# Patient Record
Sex: MALE | Race: WHITE | Employment: OTHER | URBAN - METROPOLITAN AREA
[De-identification: names, ages, dates, MRNs, and addresses within clinical notes are randomized per-mention and may not be internally consistent; named-entity substitution may affect disease eponyms.]

---

## 2019-07-20 ENCOUNTER — HOSPITAL ENCOUNTER (EMERGENCY)
Age: 84
Discharge: HOME | End: 2019-07-20
Attending: EMERGENCY MEDICINE
Payer: MEDICARE

## 2019-07-20 ENCOUNTER — HOSPITAL ENCOUNTER (EMERGENCY)
Dept: CT IMAGING | Age: 84
Discharge: HOME | End: 2019-07-20
Attending: EMERGENCY MEDICINE
Payer: MEDICARE

## 2019-07-20 VITALS
WEIGHT: 146.83 LBS | DIASTOLIC BLOOD PRESSURE: 72 MMHG | TEMPERATURE: 98.6 F | OXYGEN SATURATION: 94 % | BODY MASS INDEX: 23.6 KG/M2 | HEIGHT: 66 IN | SYSTOLIC BLOOD PRESSURE: 173 MMHG | HEART RATE: 68 BPM | RESPIRATION RATE: 16 BRPM

## 2019-07-20 DIAGNOSIS — R51.9 ACUTE NONINTRACTABLE HEADACHE, UNSPECIFIED HEADACHE TYPE: Primary | ICD-10-CM

## 2019-07-20 LAB
ANION GAP BLD CALC-SCNC: 16 MMOL/L (ref 10–20)
BUN BLD-MCNC: 22 MG/DL (ref 9–20)
CA-I BLD-MCNC: 1.18 MMOL/L (ref 1.12–1.32)
CHLORIDE BLD-SCNC: 97 MMOL/L (ref 98–107)
CO2 BLD-SCNC: 30 MMOL/L (ref 21–32)
CREAT BLD-MCNC: 1.3 MG/DL (ref 0.6–1.3)
GLUCOSE BLD-MCNC: 192 MG/DL (ref 65–100)
HCT VFR BLD CALC: 44 % (ref 36.6–50.3)
POTASSIUM BLD-SCNC: 3.4 MMOL/L (ref 3.5–5.1)
SERVICE CMNT-IMP: ABNORMAL
SODIUM BLD-SCNC: 139 MMOL/L (ref 136–145)

## 2019-07-20 PROCEDURE — 80047 BASIC METABLC PNL IONIZED CA: CPT

## 2019-07-20 PROCEDURE — 74011636320 HC RX REV CODE- 636/320: Performed by: EMERGENCY MEDICINE

## 2019-07-20 PROCEDURE — 70496 CT ANGIOGRAPHY HEAD: CPT

## 2019-07-20 PROCEDURE — 70450 CT HEAD/BRAIN W/O DYE: CPT

## 2019-07-20 PROCEDURE — 99283 EMERGENCY DEPT VISIT LOW MDM: CPT

## 2019-07-20 RX ORDER — HYDROCHLOROTHIAZIDE 25 MG/1
25 TABLET ORAL DAILY
COMMUNITY

## 2019-07-20 RX ORDER — METHYLPREDNISOLONE 4 MG/1
TABLET ORAL
Qty: 1 DOSE PACK | Refills: 0 | Status: SHIPPED | OUTPATIENT
Start: 2019-07-20 | End: 2020-06-08

## 2019-07-20 RX ADMIN — IOPAMIDOL 100 ML: 755 INJECTION, SOLUTION INTRAVENOUS at 11:33

## 2019-07-20 NOTE — DISCHARGE INSTRUCTIONS

## 2019-07-20 NOTE — ED PROVIDER NOTES
Pt. Presents to the ER with complaints of left sided headache. Pt's sx have been present for years. However, they have been present more frequently over the last month. Pt. Says that he gets a sharp pain at his left temple. The pain comes on suddenly, and it only lasts approximately 2 seconds. Pt. Took a norco for it (which he has for his knee pain), and it didn't seem to help. No fevers/chills. No n/v/d. No numbness/tingling or weakness. No vision changes. Past Medical History:   Diagnosis Date    A-fib ([facility] [facility]     Arthritis     Enlarged prostate     High cholesterol     Hx of completed stroke     Hypertension     Stroke [facility])        Past Surgical History:   Procedure Laterality Date    HX HERNIA REPAIR      HX TONSILLECTOMY           History reviewed. No pertinent family history.     Social History     Socioeconomic History    Marital status:      Spouse name: Not on file    Number of children: Not on file    Years of education: Not on file    Highest education level: Not on file   Occupational History    Not on file   Social Needs    Financial resource strain: Not on file    Food insecurity:     Worry: Not on file     Inability: Not on file    Transportation needs:     Medical: Not on file     Non-medical: Not on file   Tobacco Use    Smoking status: Never Smoker    Smokeless tobacco: Never Used   Substance and Sexual Activity    Alcohol use: No    Drug use: No    Sexual activity: Not on file   Lifestyle    Physical activity:     Days per week: Not on file     Minutes per session: Not on file    Stress: Not on file   Relationships    Social connections:     Talks on phone: Not on file     Gets together: Not on file     Attends Sabianism service: Not on file     Active member of club or organization: Not on file     Attends meetings of clubs or organizations: Not on file     Relationship status: Not on file    Intimate partner violence:     Fear of current or ex partner: Not on file     Emotionally abused: Not on file     Physically abused: Not on file     Forced sexual activity: Not on file   Other Topics Concern    Not on file   Social History Narrative    Not on file         ALLERGIES: Patient has no known allergies. Review of Systems   Constitutional: Negative for chills and fever. HENT: Negative for rhinorrhea and sore throat. Respiratory: Negative for cough and shortness of breath. Cardiovascular: Negative for chest pain. Gastrointestinal: Negative for abdominal pain, diarrhea, nausea and vomiting. Genitourinary: Negative for dysuria and hematuria. Musculoskeletal: Negative for arthralgias and myalgias. Skin: Negative for pallor and rash. Neurological: Positive for headaches. Negative for dizziness, weakness and light-headedness. All other systems reviewed and are negative. Vitals:    07/20/19 1014   BP: 182/81   Pulse: 93   Resp: 16   Temp: 98.6 °F (37 °C)   SpO2: 97%   Weight: 66.6 kg (146 lb 13.2 oz)   Height: 5' 6\" (1.676 m)            Physical Exam     Vital signs reviewed. Nursing notes reviewed.     Const:  No acute distress, well developed, well nourished  Head:  Atraumatic, normocephalic  Eyes:  PERRL, conjunctiva normal, no scleral icterus  HEENT:  No tenderness or swelling over the bilateral temples, no bruising, no rash  Neck:  Supple, trachea midline  Cardiovascular:  RRR, no murmurs, no gallops, no rubs  Resp:  No resp distress, no increased work of breathing, no wheezes, no rhonchi, no rales,  Abd:  Soft, non-tender, non-distended, no rebound, no guarding, no CVA tenderness  MSK:  No pedal edema, normal ROM  Neuro:  Alert and oriented x3, no cranial nerve defect, equal strength in his bilateral upper and lower extremities  Skin:  Warm, dry, intact  Psych: normal mood and affect, behavior is normal, judgement and thought content is normal          MDM  Number of Diagnoses or Management Options  Acute nonintractable headache, unspecified headache type:      Amount and/or Complexity of Data Reviewed  Clinical lab tests: ordered and reviewed  Tests in the radiology section of CPT®: ordered and reviewed  Review and summarize past medical records: yes    Patient Progress  Patient progress: stable         Pt. Presents to the ER with complaints of headache. Pt. Is well appearing and pain free in the ER. No mass, bleed, or aneurysm on CT. Pt. To f/u with his PCP or return to the ER with worsening sx.       Procedures

## 2019-07-20 NOTE — ED TRIAGE NOTES
Pt presents to ED with c/o intermittent left temporal headache over the past several weeks. Pt states he had this onset this morning but has subsided. Pt is awake alert and oriented. Pt denies other symptoms.

## 2020-06-08 ENCOUNTER — APPOINTMENT (OUTPATIENT)
Dept: CT IMAGING | Age: 85
End: 2020-06-08
Attending: EMERGENCY MEDICINE
Payer: MEDICARE

## 2020-06-08 ENCOUNTER — HOSPITAL ENCOUNTER (OUTPATIENT)
Age: 85
Setting detail: OBSERVATION
Discharge: HOME | End: 2020-06-09
Attending: EMERGENCY MEDICINE | Admitting: INTERNAL MEDICINE
Payer: MEDICARE

## 2020-06-08 DIAGNOSIS — N32.89 BLADDER MASS: Primary | ICD-10-CM

## 2020-06-08 DIAGNOSIS — N39.0 URINARY TRACT INFECTION WITHOUT HEMATURIA, SITE UNSPECIFIED: ICD-10-CM

## 2020-06-08 PROBLEM — K59.00 CONSTIPATED: Status: ACTIVE | Noted: 2020-06-08

## 2020-06-08 LAB
ALBUMIN SERPL-MCNC: 4.2 G/DL (ref 3.5–5)
ALBUMIN/GLOB SERPL: 1.6 {RATIO} (ref 1.1–2.2)
ALP SERPL-CCNC: 50 U/L (ref 45–117)
ALT SERPL-CCNC: 21 U/L (ref 12–78)
ANION GAP SERPL CALC-SCNC: 8 MMOL/L (ref 5–15)
APPEARANCE UR: ABNORMAL
AST SERPL-CCNC: 17 U/L (ref 15–37)
BACTERIA URNS QL MICRO: ABNORMAL /HPF
BASOPHILS # BLD: 0 K/UL (ref 0–0.1)
BASOPHILS NFR BLD: 0 % (ref 0–1)
BILIRUB SERPL-MCNC: 1 MG/DL (ref 0.2–1)
BILIRUB UR QL: NEGATIVE
BUN SERPL-MCNC: 22 MG/DL (ref 6–20)
BUN/CREAT SERPL: 18 (ref 12–20)
CALCIUM SERPL-MCNC: 9.4 MG/DL (ref 8.5–10.1)
CHLORIDE SERPL-SCNC: 101 MMOL/L (ref 97–108)
CO2 SERPL-SCNC: 30 MMOL/L (ref 21–32)
COLOR UR: ABNORMAL
CREAT SERPL-MCNC: 1.24 MG/DL (ref 0.7–1.3)
DIFFERENTIAL METHOD BLD: ABNORMAL
EOSINOPHIL # BLD: 0 K/UL (ref 0–0.4)
EOSINOPHIL NFR BLD: 0 % (ref 0–7)
EPITH CASTS URNS QL MICRO: ABNORMAL /LPF
ERYTHROCYTE [DISTWIDTH] IN BLOOD BY AUTOMATED COUNT: 12.5 % (ref 11.5–14.5)
GLOBULIN SER CALC-MCNC: 2.7 G/DL (ref 2–4)
GLUCOSE SERPL-MCNC: 145 MG/DL (ref 65–100)
GLUCOSE UR STRIP.AUTO-MCNC: NEGATIVE MG/DL
HCT VFR BLD AUTO: 44.9 % (ref 36.6–50.3)
HGB BLD-MCNC: 14.6 G/DL (ref 12.1–17)
HGB UR QL STRIP: ABNORMAL
IMM GRANULOCYTES # BLD AUTO: 0 K/UL (ref 0–0.04)
IMM GRANULOCYTES NFR BLD AUTO: 0 % (ref 0–0.5)
KETONES UR QL STRIP.AUTO: NEGATIVE MG/DL
LACTATE SERPL-SCNC: 1.4 MMOL/L (ref 0.4–2)
LEUKOCYTE ESTERASE UR QL STRIP.AUTO: ABNORMAL
LIPASE SERPL-CCNC: 58 U/L (ref 73–393)
LYMPHOCYTES # BLD: 1.2 K/UL (ref 0.8–3.5)
LYMPHOCYTES NFR BLD: 11 % (ref 12–49)
MCH RBC QN AUTO: 31.3 PG (ref 26–34)
MCHC RBC AUTO-ENTMCNC: 32.5 G/DL (ref 30–36.5)
MCV RBC AUTO: 96.1 FL (ref 80–99)
MONOCYTES # BLD: 0.6 K/UL (ref 0–1)
MONOCYTES NFR BLD: 6 % (ref 5–13)
NEUTS SEG # BLD: 9.2 K/UL (ref 1.8–8)
NEUTS SEG NFR BLD: 83 % (ref 32–75)
NITRITE UR QL STRIP.AUTO: NEGATIVE
NRBC # BLD: 0 K/UL (ref 0–0.01)
NRBC BLD-RTO: 0 PER 100 WBC
PH UR STRIP: 7 [PH] (ref 5–8)
PLATELET # BLD AUTO: 235 K/UL (ref 150–400)
PMV BLD AUTO: 9.7 FL (ref 8.9–12.9)
POTASSIUM SERPL-SCNC: 3.3 MMOL/L (ref 3.5–5.1)
PROT SERPL-MCNC: 6.9 G/DL (ref 6.4–8.2)
PROT UR STRIP-MCNC: 100 MG/DL
RBC # BLD AUTO: 4.67 M/UL (ref 4.1–5.7)
RBC #/AREA URNS HPF: ABNORMAL /HPF (ref 0–5)
SODIUM SERPL-SCNC: 139 MMOL/L (ref 136–145)
SP GR UR REFRACTOMETRY: 1.01 (ref 1–1.03)
UR CULT HOLD, URHOLD: NORMAL
UROBILINOGEN UR QL STRIP.AUTO: 0.2 EU/DL (ref 0.2–1)
WBC # BLD AUTO: 11.2 K/UL (ref 4.1–11.1)
WBC URNS QL MICRO: >100 /HPF (ref 0–4)

## 2020-06-08 PROCEDURE — 74011000250 HC RX REV CODE- 250: Performed by: EMERGENCY MEDICINE

## 2020-06-08 PROCEDURE — 87086 URINE CULTURE/COLONY COUNT: CPT

## 2020-06-08 PROCEDURE — 85025 COMPLETE CBC W/AUTO DIFF WBC: CPT

## 2020-06-08 PROCEDURE — 99218 HC RM OBSERVATION: CPT

## 2020-06-08 PROCEDURE — 96375 TX/PRO/DX INJ NEW DRUG ADDON: CPT

## 2020-06-08 PROCEDURE — 96374 THER/PROPH/DIAG INJ IV PUSH: CPT

## 2020-06-08 PROCEDURE — 51702 INSERT TEMP BLADDER CATH: CPT

## 2020-06-08 PROCEDURE — 83605 ASSAY OF LACTIC ACID: CPT

## 2020-06-08 PROCEDURE — 83690 ASSAY OF LIPASE: CPT

## 2020-06-08 PROCEDURE — 74011636320 HC RX REV CODE- 636/320: Performed by: EMERGENCY MEDICINE

## 2020-06-08 PROCEDURE — 87186 SC STD MICRODIL/AGAR DIL: CPT

## 2020-06-08 PROCEDURE — 74177 CT ABD & PELVIS W/CONTRAST: CPT

## 2020-06-08 PROCEDURE — 81001 URINALYSIS AUTO W/SCOPE: CPT

## 2020-06-08 PROCEDURE — 99284 EMERGENCY DEPT VISIT MOD MDM: CPT

## 2020-06-08 PROCEDURE — 65270000029 HC RM PRIVATE

## 2020-06-08 PROCEDURE — 74011250637 HC RX REV CODE- 250/637: Performed by: EMERGENCY MEDICINE

## 2020-06-08 PROCEDURE — 74011250636 HC RX REV CODE- 250/636: Performed by: EMERGENCY MEDICINE

## 2020-06-08 PROCEDURE — 36415 COLL VENOUS BLD VENIPUNCTURE: CPT

## 2020-06-08 PROCEDURE — 87077 CULTURE AEROBIC IDENTIFY: CPT

## 2020-06-08 PROCEDURE — 80053 COMPREHEN METABOLIC PANEL: CPT

## 2020-06-08 RX ORDER — HYDROCODONE BITARTRATE AND ACETAMINOPHEN 5; 325 MG/1; MG/1
1 TABLET ORAL
Status: COMPLETED | OUTPATIENT
Start: 2020-06-08 | End: 2020-06-08

## 2020-06-08 RX ORDER — FENTANYL CITRATE 50 UG/ML
50 INJECTION, SOLUTION INTRAMUSCULAR; INTRAVENOUS
Status: COMPLETED | OUTPATIENT
Start: 2020-06-08 | End: 2020-06-08

## 2020-06-08 RX ADMIN — CEFTRIAXONE SODIUM 1 G: 1 INJECTION, POWDER, FOR SOLUTION INTRAMUSCULAR; INTRAVENOUS at 19:08

## 2020-06-08 RX ADMIN — SODIUM CHLORIDE 1000 ML: 900 INJECTION, SOLUTION INTRAVENOUS at 17:13

## 2020-06-08 RX ADMIN — HYDROCODONE BITARTRATE AND ACETAMINOPHEN 1 TABLET: 5; 325 TABLET ORAL at 22:16

## 2020-06-08 RX ADMIN — IOPAMIDOL 100 ML: 755 INJECTION, SOLUTION INTRAVENOUS at 18:03

## 2020-06-08 RX ADMIN — FENTANYL CITRATE 50 MCG: 50 INJECTION INTRAMUSCULAR; INTRAVENOUS at 17:13

## 2020-06-08 NOTE — ED TRIAGE NOTES
Pt reports lower abd. Pressure and constipation over the past 3 days. Pt denies nausea or vomiting or fever.

## 2020-06-08 NOTE — ED NOTES
Assisted pt up to bedside commode to attempt a BM. The pt has small amount of pink tinged drainage from the urethra. The pt was unable to have a BM.

## 2020-06-08 NOTE — ED NOTES
The pt is complaining of increased abd. Pressure. The pt is shaking at this time. Bladder scan showed 521ml. Ahuja inserted without difficulty. The pt's ahuja is draining cloudy urine at this time and pt complains of decrease in lower abd. Pressure.

## 2020-06-09 LAB
ALBUMIN SERPL-MCNC: 3.5 G/DL (ref 3.5–5)
ALBUMIN/GLOB SERPL: 1.3 {RATIO} (ref 1.1–2.2)
ALP SERPL-CCNC: 46 U/L (ref 45–117)
ALT SERPL-CCNC: 17 U/L (ref 12–78)
ANION GAP SERPL CALC-SCNC: 5 MMOL/L (ref 5–15)
AST SERPL-CCNC: 17 U/L (ref 15–37)
BILIRUB SERPL-MCNC: 1.1 MG/DL (ref 0.2–1)
BUN SERPL-MCNC: 15 MG/DL (ref 6–20)
BUN/CREAT SERPL: 15 (ref 12–20)
CALCIUM SERPL-MCNC: 8.7 MG/DL (ref 8.5–10.1)
CHLORIDE SERPL-SCNC: 104 MMOL/L (ref 97–108)
CO2 SERPL-SCNC: 29 MMOL/L (ref 21–32)
CREAT SERPL-MCNC: 1.02 MG/DL (ref 0.7–1.3)
ERYTHROCYTE [DISTWIDTH] IN BLOOD BY AUTOMATED COUNT: 12.6 % (ref 11.5–14.5)
GLOBULIN SER CALC-MCNC: 2.7 G/DL (ref 2–4)
GLUCOSE SERPL-MCNC: 111 MG/DL (ref 65–100)
HCT VFR BLD AUTO: 42.6 % (ref 36.6–50.3)
HGB BLD-MCNC: 14.4 G/DL (ref 12.1–17)
MAGNESIUM SERPL-MCNC: 2.1 MG/DL (ref 1.6–2.4)
MCH RBC QN AUTO: 32.6 PG (ref 26–34)
MCHC RBC AUTO-ENTMCNC: 33.8 G/DL (ref 30–36.5)
MCV RBC AUTO: 96.4 FL (ref 80–99)
NRBC # BLD: 0 K/UL (ref 0–0.01)
NRBC BLD-RTO: 0 PER 100 WBC
PHOSPHATE SERPL-MCNC: 3.1 MG/DL (ref 2.6–4.7)
PLATELET # BLD AUTO: 225 K/UL (ref 150–400)
PMV BLD AUTO: 9.8 FL (ref 8.9–12.9)
POTASSIUM SERPL-SCNC: 3.4 MMOL/L (ref 3.5–5.1)
PROT SERPL-MCNC: 6.2 G/DL (ref 6.4–8.2)
RBC # BLD AUTO: 4.42 M/UL (ref 4.1–5.7)
SODIUM SERPL-SCNC: 138 MMOL/L (ref 136–145)
WBC # BLD AUTO: 8.6 K/UL (ref 4.1–11.1)

## 2020-06-09 PROCEDURE — 74011250637 HC RX REV CODE- 250/637: Performed by: INTERNAL MEDICINE

## 2020-06-09 PROCEDURE — 83735 ASSAY OF MAGNESIUM: CPT

## 2020-06-09 PROCEDURE — 80053 COMPREHEN METABOLIC PANEL: CPT

## 2020-06-09 PROCEDURE — 74011250636 HC RX REV CODE- 250/636: Performed by: INTERNAL MEDICINE

## 2020-06-09 PROCEDURE — 99218 HC RM OBSERVATION: CPT

## 2020-06-09 PROCEDURE — 96372 THER/PROPH/DIAG INJ SC/IM: CPT

## 2020-06-09 PROCEDURE — 84100 ASSAY OF PHOSPHORUS: CPT

## 2020-06-09 PROCEDURE — 36415 COLL VENOUS BLD VENIPUNCTURE: CPT

## 2020-06-09 PROCEDURE — 77030012865 HC BG URIN LEG MDII -A

## 2020-06-09 PROCEDURE — 85027 COMPLETE CBC AUTOMATED: CPT

## 2020-06-09 RX ORDER — DEXTROSE, SODIUM CHLORIDE, AND POTASSIUM CHLORIDE 5; .45; .15 G/100ML; G/100ML; G/100ML
75 INJECTION INTRAVENOUS CONTINUOUS
Status: DISCONTINUED | OUTPATIENT
Start: 2020-06-09 | End: 2020-06-09 | Stop reason: HOSPADM

## 2020-06-09 RX ORDER — POLYETHYLENE GLYCOL 3350 17 G/17G
17 POWDER, FOR SOLUTION ORAL 2 TIMES DAILY
Status: DISCONTINUED | OUTPATIENT
Start: 2020-06-09 | End: 2020-06-09 | Stop reason: HOSPADM

## 2020-06-09 RX ORDER — ZOLPIDEM TARTRATE 5 MG/1
5 TABLET ORAL
Status: DISCONTINUED | OUTPATIENT
Start: 2020-06-09 | End: 2020-06-09 | Stop reason: HOSPADM

## 2020-06-09 RX ORDER — POTASSIUM CHLORIDE 750 MG/1
40 TABLET, FILM COATED, EXTENDED RELEASE ORAL
Status: COMPLETED | OUTPATIENT
Start: 2020-06-09 | End: 2020-06-09

## 2020-06-09 RX ORDER — TAMSULOSIN HYDROCHLORIDE 0.4 MG/1
0.4 CAPSULE ORAL DAILY
Status: DISCONTINUED | OUTPATIENT
Start: 2020-06-09 | End: 2020-06-09 | Stop reason: HOSPADM

## 2020-06-09 RX ORDER — NALOXONE HYDROCHLORIDE 0.4 MG/ML
0.4 INJECTION, SOLUTION INTRAMUSCULAR; INTRAVENOUS; SUBCUTANEOUS AS NEEDED
Status: DISCONTINUED | OUTPATIENT
Start: 2020-06-09 | End: 2020-06-09 | Stop reason: HOSPADM

## 2020-06-09 RX ORDER — FACIAL-BODY WIPES
10 EACH TOPICAL DAILY PRN
Status: DISCONTINUED | OUTPATIENT
Start: 2020-06-09 | End: 2020-06-09 | Stop reason: HOSPADM

## 2020-06-09 RX ORDER — HYDROCODONE BITARTRATE AND ACETAMINOPHEN 5; 325 MG/1; MG/1
1 TABLET ORAL
Status: DISCONTINUED | OUTPATIENT
Start: 2020-06-09 | End: 2020-06-09 | Stop reason: HOSPADM

## 2020-06-09 RX ORDER — HYDROMORPHONE HYDROCHLORIDE 1 MG/ML
0.5 INJECTION, SOLUTION INTRAMUSCULAR; INTRAVENOUS; SUBCUTANEOUS
Status: DISCONTINUED | OUTPATIENT
Start: 2020-06-09 | End: 2020-06-09 | Stop reason: HOSPADM

## 2020-06-09 RX ORDER — ONDANSETRON 2 MG/ML
4 INJECTION INTRAMUSCULAR; INTRAVENOUS
Status: DISCONTINUED | OUTPATIENT
Start: 2020-06-09 | End: 2020-06-09 | Stop reason: HOSPADM

## 2020-06-09 RX ORDER — AMOXICILLIN 250 MG
1 CAPSULE ORAL DAILY
Status: DISCONTINUED | OUTPATIENT
Start: 2020-06-09 | End: 2020-06-09 | Stop reason: HOSPADM

## 2020-06-09 RX ORDER — DILTIAZEM HYDROCHLORIDE 120 MG/1
240 CAPSULE, COATED, EXTENDED RELEASE ORAL DAILY
Status: DISCONTINUED | OUTPATIENT
Start: 2020-06-09 | End: 2020-06-09 | Stop reason: HOSPADM

## 2020-06-09 RX ORDER — POLYETHYLENE GLYCOL 3350 17 G/17G
17 POWDER, FOR SOLUTION ORAL 2 TIMES DAILY
Qty: 10 EACH | Refills: 0 | Status: SHIPPED | OUTPATIENT
Start: 2020-06-09

## 2020-06-09 RX ORDER — HEPARIN SODIUM 5000 [USP'U]/ML
5000 INJECTION, SOLUTION INTRAVENOUS; SUBCUTANEOUS EVERY 8 HOURS
Status: DISCONTINUED | OUTPATIENT
Start: 2020-06-09 | End: 2020-06-09 | Stop reason: HOSPADM

## 2020-06-09 RX ORDER — ACETAMINOPHEN 325 MG/1
650 TABLET ORAL
Status: DISCONTINUED | OUTPATIENT
Start: 2020-06-09 | End: 2020-06-09 | Stop reason: HOSPADM

## 2020-06-09 RX ORDER — DIPHENHYDRAMINE HCL 25 MG
25 CAPSULE ORAL
Status: DISCONTINUED | OUTPATIENT
Start: 2020-06-09 | End: 2020-06-09 | Stop reason: HOSPADM

## 2020-06-09 RX ORDER — DUTASTERIDE 0.5 MG/1
0.5 CAPSULE, LIQUID FILLED ORAL DAILY
Status: DISCONTINUED | OUTPATIENT
Start: 2020-06-09 | End: 2020-06-09 | Stop reason: HOSPADM

## 2020-06-09 RX ORDER — CEFDINIR 300 MG/1
300 CAPSULE ORAL 2 TIMES DAILY
Qty: 10 CAP | Refills: 0 | Status: SHIPPED | OUTPATIENT
Start: 2020-06-09 | End: 2020-06-18

## 2020-06-09 RX ADMIN — HEPARIN SODIUM 5000 UNITS: 5000 INJECTION INTRAVENOUS; SUBCUTANEOUS at 01:40

## 2020-06-09 RX ADMIN — DUTASTERIDE 0.5 MG: 0.5 CAPSULE, LIQUID FILLED ORAL at 08:29

## 2020-06-09 RX ADMIN — HYDROCODONE BITARTRATE AND ACETAMINOPHEN 1 TABLET: 5; 325 TABLET ORAL at 12:07

## 2020-06-09 RX ADMIN — DOCUSATE SODIUM 50MG AND SENNOSIDES 8.6MG 1 TABLET: 8.6; 5 TABLET, FILM COATED ORAL at 12:03

## 2020-06-09 RX ADMIN — TAMSULOSIN HYDROCHLORIDE 0.4 MG: 0.4 CAPSULE ORAL at 08:29

## 2020-06-09 RX ADMIN — POLYETHYLENE GLYCOL 3350 17 G: 17 POWDER, FOR SOLUTION ORAL at 17:41

## 2020-06-09 RX ADMIN — HEPARIN SODIUM 5000 UNITS: 5000 INJECTION INTRAVENOUS; SUBCUTANEOUS at 08:29

## 2020-06-09 RX ADMIN — DILTIAZEM HYDROCHLORIDE 240 MG: 120 CAPSULE, COATED, EXTENDED RELEASE ORAL at 08:29

## 2020-06-09 RX ADMIN — POLYETHYLENE GLYCOL 3350 17 G: 17 POWDER, FOR SOLUTION ORAL at 12:03

## 2020-06-09 RX ADMIN — BISACODYL 10 MG: 10 SUPPOSITORY RECTAL at 12:03

## 2020-06-09 RX ADMIN — HYDROCODONE BITARTRATE AND ACETAMINOPHEN 1 TABLET: 5; 325 TABLET ORAL at 07:44

## 2020-06-09 RX ADMIN — DEXTROSE MONOHYDRATE, SODIUM CHLORIDE, AND POTASSIUM CHLORIDE 75 ML/HR: 50; 4.5; 1.49 INJECTION, SOLUTION INTRAVENOUS at 01:40

## 2020-06-09 RX ADMIN — HYDROCODONE BITARTRATE AND ACETAMINOPHEN 1 TABLET: 5; 325 TABLET ORAL at 01:40

## 2020-06-09 RX ADMIN — HEPARIN SODIUM 5000 UNITS: 5000 INJECTION INTRAVENOUS; SUBCUTANEOUS at 16:01

## 2020-06-09 RX ADMIN — POTASSIUM CHLORIDE 40 MEQ: 750 TABLET, FILM COATED, EXTENDED RELEASE ORAL at 08:29

## 2020-06-09 NOTE — DISCHARGE INSTRUCTIONS
ACUTE DIAGNOSES:  Bladder mass [N32.89]    CHRONIC MEDICAL DIAGNOSES:  Problem List as of 6/9/2020 Date Reviewed: 6/8/2020          Codes Class Noted - Resolved    * (Principal) Bladder mass ICD-10-CM: N32.89  ICD-9-CM: 596.89  6/8/2020 - Present        Constipated ICD-10-CM: K59.00  ICD-9-CM: 564.00  6/8/2020 - Present        PUD (peptic ulcer disease) (Chronic) ICD-10-CM: K27.9  ICD-9-CM: 533.90  9/29/2014 - Present        Hx of completed stroke (Chronic) ICD-10-CM: Z86.73  ICD-9-CM: V12.54  Unknown - Present        Enlarged prostate (Chronic) ICD-10-CM: N40.0  ICD-9-CM: 600.00  Unknown - Present        Hypertension (Chronic) ICD-10-CM: I10  ICD-9-CM: 401.9  Unknown - Present        High cholesterol (Chronic) ICD-10-CM: E78.00  ICD-9-CM: 272.0  Unknown - Present        Arthritis (Chronic) ICD-10-CM: M19.90  ICD-9-CM: 716.90  Unknown - Present        Anemia (Chronic) ICD-10-CM: D64.9  ICD-9-CM: 285.9  9/27/2014 - Present        CKD (chronic kidney disease) stage 3, GFR 30-59 ml/min (HCC) (Chronic) ICD-10-CM: N18.3  ICD-9-CM: 585.3  9/27/2014 - Present        A-fib ([address] (Chronic) ICD-10-CM: I48.91  ICD-9-CM: 427.31  Unknown - Present        RESOLVED: Encephalomalacia (Chronic) ICD-10-CM: G93.89  ICD-9-CM: 348.89  2/26/2016 - 6/8/2020        RESOLVED: Cerebral infarction, chronic (Chronic) ICD-10-CM: Z86.73  ICD-9-CM: V12.54  2/26/2016 - 6/8/2020        RESOLVED: Colonic polyp ICD-10-CM: J32.6  ICD-9-CM: 211.3  9/30/2014 - 2/27/2016        RESOLVED: GI bleed ICD-10-CM: K92.2  ICD-9-CM: 578.9  9/27/2014 - 2/27/2016        RESOLVED: Dehydration ICD-10-CM: E86.0  ICD-9-CM: 276.51  9/27/2014 - 2/27/2016        RESOLVED: ARF (acute renal failure) ([facility] ICD-10-CM: N17.9  ICD-9-CM: 584.9  9/27/2014 - 2/27/2016        RESOLVED: Sinus tachycardia ICD-10-CM: R00.0  ICD-9-CM: 427.89  9/27/2014 - 2/27/2016        RESOLVED: Abdominal pain ICD-10-CM: R10.9  ICD-9-CM: 789.00  9/27/2014 - 2/27/2016        RESOLVED: Dizziness ICD-10-CM: R42  ICD-9-CM: 780.4  9/27/2014 - 6/8/2020        RESOLVED: Melena ICD-10-CM: K92.1  ICD-9-CM: 578.1  9/27/2014 - 2/27/2016              DISCHARGE MEDICATIONS:          · It is important that you take the medication exactly as they are prescribed. · Keep your medication in the bottles provided by the pharmacist and keep a list of the medication names, dosages, and times to be taken in your wallet. · Do not take other medications without consulting your doctor. DIET:  Cardiac Diet    ACTIVITY: Activity as tolerated    ADDITIONAL INFORMATION: If you experience any of the following symptoms then please call your primary care physician or return to the emergency room if you cannot get hold of your doctor: Fever, chills, nausea, vomiting, diarrhea, change in mentation, falling, bleeding, shortness of breath. FOLLOW UP CARE:  Dr. Anette Samuel MD  you are to call and set up an appointment to see them in 2 weeks. Follow-up with urology      Information obtained by :  I understand that if any problems occur once I am at home I am to contact my physician. I understand and acknowledge receipt of the instructions indicated above.                                                                                                                                            Physician's or R.N.'s Signature                                                                  Date/Time                                                                                                                                              Patient or Representative Signature                                                          Date/Time

## 2020-06-09 NOTE — PROGRESS NOTES
I spoke to Dr Za Vergara 's nurse ,about to discontinue the ahuja catheter or to discharge the patient with it, since he has urinary retention with blood urine. Per  Arash Hallman nurse , the doctor signed out on the patient, so I will have to consulter another urology doctor. Dr Xi Delgadillo notified and per MD order , is to leave to ahuja in, and will follow up with urology on outpatient. Leg bag provided and teaching provided to patient. No acute distress or acute changes noted.

## 2020-06-09 NOTE — ED NOTES
TRANSFER - OUT REPORT:    Verbal report given to John Burnett RN(name) on Junito Zamora  being transferred to [facility](unit) for urgent transfer       Report consisted of patients Situation, Background, Assessment and   Recommendations(SBAR). Information from the following report(s) SBAR, ED Summary and MAR was reviewed with the receiving nurse. Lines:   Peripheral IV 06/08/20 (Active)        Opportunity for questions and clarification was provided.       Patient transported with:SELF

## 2020-06-09 NOTE — ED NOTES
Pt updated on plan of care. Family at bedside. Per [facility] Supervisor the pt is to be held [facility] until bed can be assigned.

## 2020-06-09 NOTE — PROGRESS NOTES
Nutrition Assessment:    RECOMMENDATIONS/INTERVENTION(S):   1. Continue cardiac diet. 2. Recommended nutrition supplements at home. 3. Provided high-fiber diet education per pt's family request.    4. Continue to monitor intakes, wt changes, labs, GI.    ASSESSMENT:   6/9: Pt assessed for low BMI. Admitted with bladder mass. PMH includes HTN, CKD3, PUD. BMI 22.8, c/w underweight per advanced age (>69 y/o). Her H&P, pt had constipation x2-3 days PTA. Pt reports good appetite, says he ate all of eggs and english muffin with few bites of oatmeal for breakfast this AM. Eating well PTA, 3 meals/d. Denies any recent weight changes, says weight stays stable ~152#. No c/o N/V. Pt says he was told he can go home today since he had a BM. D/c orders in per chart. Pt says he has Ensure Clear drinks at home but hasn't tried them. Encouraged ONS intake at home for increased protein. Pt's wife at bedside requesting high-fiber diet education for pt- provided written and verbal education. Pt and family receptive. No questions at this time. Labs- K 3.4, -145. Meds- [email or web address] Diet Order: Cardiac  % Eaten:  No data found. Pertinent Medications: [x] Reviewed    Labs: [x] Reviewed    Anthropometrics: Height: 5' 8\" (172.7 cm) Weight: 68 kg (150 lb)    IBW (%IBW):   ( ) UBW (%UBW):   (  %)      BMI: Body mass index is 22.81 kg/m². This BMI is indicative of:   [x] Underweight for age    [] Normal    [] Overweight    []  Obesity    []  Extreme Obesity (BMI>40)  Estimated Nutrition Needs (Based on): 0947 Kcals/day(1341 x 1.3 AF) , 68 g(1-1.2 g/kg) Protein  Carbohydrate: At Least 130 g/day  Fluids: 1743 mL/day (1 ml/kcal)    Last BM: 6/9   [x]Active     []Hyperactive  []Hypoactive       [] Absent   BS  Skin:    [x] Intact   [] Incision  [] Breakdown   [] DTI   [] Tears/Excoriation/Abrasion  []Edema [] Other:      Wt Readings from Last 30 Encounters:   06/08/20 68 kg (150 lb)   07/20/19 66.6 kg (146 lb 13.2 oz)   02/28/16 65.1 kg (143 lb 9.6 oz)   02/27/16 67.6 kg (149 lb)   09/29/14 67.2 kg (148 lb 1.6 oz)   02/15/14 70.3 kg (155 lb)      NUTRITION DIAGNOSES:   Problem:  Underweight     Etiology: related to hx of inadequate energy intake     Signs/Symptoms: as evidenced by BMI 22.8, underweight for age      NUTRITION INTERVENTIONS:  Meals/Snacks: General/healthful diet   Supplements: Commercial supplement              GOAL:   PO intake >75% meals + ONS next 1-3 days    Cultural, Quaker, or Ethnic Dietary Needs:  none    EDUCATION & DISCHARGE NEEDS:    [x] None Identified   [] Identified and Education Provided/Documented   [] Identified and Pt declined/was not appropriate      [] Interdisciplinary Care Plan Reviewed/Documented    [x] Discharge Needs: regular, high-fiber diet   [] No Nutrition Related Discharge Needs    NUTRITION RISK:   Pt Is At Nutrition Risk  [x]     No Nutrition Risk Identified  []       PT SEEN FOR:    []  MD Consult: []Calorie Count      []Diabetic Diet Education        []Diet Education     []Electrolyte Management     []General Nutrition Management and Supplements     []Management of Tube Feeding     []TPN Recommendations    []  RN Referral:  []MST score >=2     []Enteral/Parenteral Nutrition PTA     []Pregnant: Gestational DM or Multigestation                 [] Pressure Ulcer    [x]  Low BMI      []  Length of Stay       [] Dysphagia Diet         [] Ventilator  []  Follow-up     Previous Recommendations:   [] Implemented          [] Not Implemented          [x] Not Applicable    Previous Goal:   [] Met              [] Progressing Towards Goal              [] Not Progressing Towards Goal   [x] Not Applicable            Trip Males, [address] [facility]  Pager [phone]  Phone [phone]

## 2020-06-09 NOTE — PROGRESS NOTES
Case Management Note:    Patient will need to go home with ahuja will need RN home health care. They selected to go with [facility]. Referral sent. Verbal Consent given for Choice.     Discharge Location  Discharge Placement: Home with home health   [facility]

## 2020-06-09 NOTE — PROGRESS NOTES
Dakota Conley [facility]  [address]  [phone]      Medical Progress Note      NAME: Zamzam Lopez   :  1934  MRM:  [id number]    Date of service: 2020  7:34 AM       Assessment and Plan:   1. Bladder mass / Enlarged prostate - POA, new mass. Concern of cancer. Consult urology. Pain control. continue Avodart and flomax. Possible outpatient FU.      2. Hx of completed stroke / High cholesterol - Continue atorvastatin. Hold Plavix due to likely surgery.     3. Hypertension / Hx A-fib - continue Diltiazem. He does not report anticoagulation.     4.  Constipated / PUD (peptic ulcer disease) - constipation due to mass. Not on PPI. Monitor. Bowel regimen      5. Anemia - Mild and may worsen with hydration. Likely due to chronic disease.     6. Arthritis - Tylenol prn. No NSAIDS    7.  UTI(POA). Continue ABx. Subjective:     Chief Complaint[de-identified] Patient was seen and examined as a follow up for bladder mass. Chart was reviewed. still constipated     ROS:  (bold if positive, if negative)    Tolerating PT  Tolerating Diet        Objective:     Last 24hrs VS reviewed since prior progress note.  Most recent are:    Visit Vitals  /73 (BP 1 Location: Right arm, BP Patient Position: At rest)   Pulse 60   Temp 98 °F (36.7 °C)   Resp 16   Ht 5' 8\" (1.727 m)   Wt 68 kg (150 lb)   SpO2 97%   BMI 22.81 kg/m²     SpO2 Readings from Last 6 Encounters:   20 97%   19 94%   16 99%   14 97%   02/15/14 97%            Intake/Output Summary (Last 24 hours) at 2020 0746  Last data filed at 2020 0417  Gross per 24 hour   Intake 1000 ml   Output 1450 ml   Net -450 ml        Physical Exam:    Gen:  Well-developed, well-nourished, in no acute distress  HEENT:  Pink conjunctivae, PERRL, hearing intact to voice, moist mucous membranes  Neck:  Supple, without masses, thyroid non-tender  Resp:  No accessory muscle use, clear breath sounds without wheezes rales or rhonchi  Card:  No murmurs, normal S1, S2 without thrills, bruits or peripheral edema  Abd:  Soft, non-tender, non-distended, normoactive bowel sounds are present, no palpable organomegaly and no detectable hernias  Lymph:  No cervical or inguinal adenopathy  Musc:  No cyanosis or clubbing  Skin:  No rashes or ulcers, skin turgor is good  Neuro:  Cranial nerves are grossly intact, no focal motor weakness, follows commands appropriately  Psych:  Good insight, oriented to person, place and time, alert  __________________________________________________________________  Medications Reviewed: (see below)  Medications:     Current Facility-Administered Medications   Medication Dose Route Frequency    cefTRIAXone (ROCEPHIN) 1 g in 0.9% sodium chloride (MBP/ADV) 50 mL  1 g IntraVENous Q24H    dilTIAZem ER (CARDIZEM CD) capsule 240 mg  240 mg Oral DAILY    dutasteride (AVODART) capsule 0.5 mg  0.5 mg Oral DAILY    HYDROcodone-acetaminophen (NORCO) 5-325 mg per tablet 1 Tab  1 Tab Oral Q4H PRN    tamsulosin (FLOMAX) capsule 0.4 mg  0.4 mg Oral DAILY    naloxone (NARCAN) injection 0.4 mg  0.4 mg IntraVENous PRN    zolpidem (AMBIEN) tablet 5 mg  5 mg Oral QHS PRN    dextrose 5% - 0.45% NaCl with KCl 20 mEq/L infusion  75 mL/hr IntraVENous CONTINUOUS    acetaminophen (TYLENOL) tablet 650 mg  650 mg Oral Q4H PRN    HYDROmorphone (DILAUDID) syringe 0.5 mg  0.5 mg IntraVENous Q4H PRN    diphenhydrAMINE (BENADRYL) capsule 25 mg  25 mg Oral Q4H PRN    ondansetron (ZOFRAN) injection 4 mg  4 mg IntraVENous Q4H PRN    heparin (porcine) injection 5,000 Units  5,000 Units SubCUTAneous Q8H    potassium chloride SR (KLOR-CON 10) tablet 40 mEq  40 mEq Oral NOW        Lab Data Reviewed: (see below)  Lab Review:     Recent Labs     06/09/20  0500 06/08/20  1702   WBC 8.6 11.2*   HGB 14.4 14.6   HCT 42.6 44.9    235     Recent Labs     06/09/20  0500 06/08/20  1702    139   K 3. 4* 3.3*    101   CO2 29 30   * 145*   BUN 15 22*   CREA 1.02 1.24   CA 8.7 9.4   MG 2.1  --    PHOS 3.1  --    ALB 3.5 4.2   TBILI 1.1* 1.0   ALT 17 21     Lab Results   Component Value Date/Time    Glucose (POC) 192 (H) 07/20/2019 10:34 AM    Glucose (POC) 224 (H) 02/28/2016 11:28 AM    Glucose (POC) 111 (H) 02/28/2016 08:22 AM    Glucose (POC) 77 02/28/2016 07:41 AM    Glucose (POC) 124 (H) 02/15/2014 10:50 AM     No results for input(s): PH, PCO2, PO2, HCO3, FIO2 in the last 72 hours. No results for input(s): INR, INREXT in the last 72 hours. All Micro Results     Procedure Component Value Units Date/Time    CULTURE, URINE [[id number]] Collected:  06/08/20 1826    Order Status:  Completed Specimen:  Cath Urine Updated:  06/08/20 2117    URINE CULTURE HOLD SAMPLE [[id number]] Collected:  06/08/20 1826    Order Status:  Completed Specimen:  Urine from Serum Updated:  06/08/20 1829     Urine culture hold       Urine on hold in Microbiology dept for 2 days. If unpreserved urine is submitted, it cannot be used for addtional testing after 24 hours, recollection will be required. I have reviewed notes of prior 24hr. Other pertinent lab:      Total time spent with patient: [address] discussed with: Patient, Nursing Staff and >50% of time spent in counseling and coordination of care    Discussed:  Care Plan    Prophylaxis:  Hep SQ    Disposition:  Home w/Family           ___________________________________________________    Attending Physician: Joaquín Villatoro MD

## 2020-06-09 NOTE — PROGRESS NOTES
Full consult to follow, new patient to our practice  Admission recent constipation, UA WBC>RBC = UTI pending cultures on Rocephin, no signs of spesis  H/o BPH arrives on Tamsulosin/Finasteride.   Creatinine OK  CT shows constipation, enlarged prostate w/median lobe, enhancing left wall bladder tumor, no hydro  I would disagree this is adding to contipation

## 2020-06-09 NOTE — H&P
[facility]    Hospitalist Admission Note      NAME:  Junito Zamora   :   1934   MRN:  [id number]     PCP:  Katrin Young MD     Date/Time of service:  2020 9:12 PM          Subjective:     CHIEF COMPLAINT: constipation     HISTORY OF PRESENT ILLNESS:     Mr. Pia Bradford is a 80 y.o.  male who presented to the Emergency Department complaining of constipation. Worsening over days without relief. ER CT scan finds bladder mass contributed to fecal accumulation. We will admit him for management. Past Medical History:   Diagnosis Date    A-fib ([address]     Arthritis     Enlarged prostate     High cholesterol     Hx of completed stroke     Hypertension         Past Surgical History:   Procedure Laterality Date    HX HERNIA REPAIR      HX TONSILLECTOMY         Social History     Tobacco Use    Smoking status: Never Smoker    Smokeless tobacco: Never Used   Substance Use Topics    Alcohol use: No        History reviewed. No pertinent family history. Family hx cannot be fully assessed, since the patient cannot provide information    No Known Allergies     Prior to Admission medications    Medication Sig Start Date End Date Taking? Authorizing Provider   hydroCHLOROthiazide (HYDRODIURIL) 25 mg tablet Take 25 mg by mouth daily. Sam Pederson MD   methylPREDNISolone (MEDROL, MATT,) 4 mg tablet Take as directed. 19   Andra Olivares MD   dutasteride (AVODART) 0.5 mg capsule Take 0.5 mg by mouth daily. Sam Pederson MD   hydrocodone-acetaminophen (NORCO) 5-325 mg per tablet Take 1 tablet by mouth every four (4) hours as needed for Pain. Sam Pederson MD   tamsulosin (FLOMAX) 0.4 mg capsule Take 0.4 mg by mouth daily. Sam Pederson MD   diltiazem hcl 240 mg Tb24 Take 240 mg by mouth daily. Sam Pederson MD   atorvastatin (LIPITOR) 20 mg tablet Take 20 mg by mouth daily. Sam Pederson MD   clopidogrel (PLAVIX) 75 mg tablet Take 75 mg by mouth daily. Other, MD Sam       Review of Systems:  (bold if positive, if negative)    Gen:  Eyes:  ENT:  CVS:  Pulm:  GI:  Abdominal pain,constipationGU:  MS:  Skin:  Psych:  Endo:  Hem:  Renal:  Neuro:        Objective:      VITALS:    Vital signs reviewed; most recent are:    Visit Vitals  /78 (BP 1 Location: Right arm, BP Patient Position: At rest)   Pulse 87   Temp 97.5 °F (36.4 °C)   Resp 15   Ht 5' 8\" (1.727 m)   Wt 68 kg (150 lb)   SpO2 96%   BMI 22.81 kg/m²     SpO2 Readings from Last 6 Encounters:   06/08/20 96%   07/20/19 94%   02/28/16 99%   09/30/14 97%   02/15/14 97%            Intake/Output Summary (Last 24 hours) at 6/8/2020 2112  Last data filed at 6/8/2020 2033  Gross per 24 hour   Intake    Output 1000 ml   Net -1000 ml        Exam:     Physical Exam:    Gen:  Thin, in no acute distress  HEENT:  Pink conjunctivae, PERRL, hearing intact to voice, moist mucous membranes  Neck:  Supple, without masses, thyroid non-tender  Resp:  No accessory muscle use, clear breath sounds without wheezes rales or rhonchi  Card:  No murmurs, normal S1, S2 without thrills, bruits or peripheral edema  Abd:  Soft, non-tender, non-distended, normoactive bowel sounds are present, no mass  Lymph:  No cervical or inguinal adenopathy  Musc:  No cyanosis or clubbing  Skin:  No rashes or ulcers, skin turgor is good  Neuro:  Cranial nerves are grossly intact, no focal motor weakness, follows commands appropriately  Psych:  Good insight, oriented to person, place and time, alert     Labs:    Recent Labs     06/08/20  1702   WBC 11.2*   HGB 14.6   HCT 44.9        Recent Labs     06/08/20  1702      K 3.3*      CO2 30   *   BUN 22*   CREA 1.24   CA 9.4   ALB 4.2   TBILI 1.0   ALT 21     Lab Results   Component Value Date/Time    Glucose (POC) 192 (H) 07/20/2019 10:34 AM    Glucose (POC) 224 (H) 02/28/2016 11:28 AM    Glucose (POC) 111 (H) 02/28/2016 08:22 AM     No results for input(s): PH, PCO2, PO2, HCO3, FIO2 in the last 72 hours. No results for input(s): INR, INREXT in the last 72 hours. All Micro Results     Procedure Component Value Units Date/Time    CULTURE, URINE [[id number]] Collected:  06/08/20 1826    Order Status:  Completed Specimen:  Cath Urine Updated:  06/08/20 1901    URINE CULTURE HOLD SAMPLE [[id number]] Collected:  06/08/20 1826    Order Status:  Completed Specimen:  Urine from Serum Updated:  06/08/20 1829     Urine culture hold       Urine on hold in Microbiology dept for 2 days. If unpreserved urine is submitted, it cannot be used for addtional testing after 24 hours, recollection will be required. I have reviewed previous records       Assessment and Plan:      Bladder mass / Enlarged prostate - POA, new mass. Concern of cancer. Consult urology and gen surgery. NPO.  IVF. Pain control. continue Avodart and flomax    Hx of completed stroke / High cholesterol - Continue atorvastatin. Hold Plavix due to likely surgery. Hypertension / Hx A-fib - continue Diltiazem. He does not report anticoagulation. Constipated / PUD (peptic ulcer disease) - constipation due to mass. Not on PPI.  NPO. Monitor. Laxatives. Anemia - Mild and may worsen with hydration. Likely due to chronic disease. CKD (chronic kidney disease) stage 3 - For today while NPO hold HCTZ. Arthritis - Tylenol prn. No NSAIDS     Telemetry reviewed:   normal sinus rhythm    Risk of deterioration: high      Total time spent with patient: 79 Minutes I personally reviewed chart, notes, data and current medications in the medical record. I have personally examined and treated the patient at bedside during this period.                  Care Plan discussed with: Patient, Nursing Staff and >50% of time spent in counseling and coordination of care    Discussed:  Care Plan       ___________________________________________________    Attending Physician: Elias Hyde MD

## 2020-06-09 NOTE — CONSULTS
Requesting Provider: Russell Reddy MD - Reason for Consultation: \"bladder tumor\"  Pre-existing [facility] Patient:   No                Patient: Trudy Mcelroy MRN: [id number]  SSN: [id number]    YOB: 1934  Age: Valadouro 3 y.o. Sex: male     Location: [facility]       Code Status: Full Code   PCP: Mack Nixon MD  - [phone]   Emergency Contact:  Primary Emergency Contact: Sana Hill, Home Phone: [phone]   Race/Mandaeism/Language: [facility] / [facility] / Munoz Lasri   Payor: Payor: Uriel Govern / Plan: Invisible / Product Type: Managed Care Medicare /    Prior Admission Data: 19 [facility] EMERGENCY DEPT     Hospitalized:  Hospital Day: 2 - Admitted 2020  4:21 PM     CONSULTANTS  IP CONSULT TO UROLOGY   ADMISSION DIAGNOSES    ICD-10-CM ICD-9-CM   1. Bladder mass N32.89 596.89   2. Urinary tract infection without hematuria, site unspecified N39.0 599.0         Assessment/Plan:       · Left wall bladder tumor  · Enlarged prostate w/ median lobe  · Constipation    -Do not believe bladder tumor is adding to constipation.   -Can DC home from Urology standpoint and FU as OP  -Home on oral abx for 7 days for UTI       CC: Constipation   HPI: He is a Valadouro 3 y.o. male w/ PMHx of Afib, CVA, on plavix, enlarged prostate, on Avodart/flomax, HTN who was admitted  for cc of constipation, worsening over several days. CT shows constipation, enlarged prostate w/ median lobe, enhancing left wall bladder tumor, no hydro. WBC 8.6. Hgb 14.4  BUN/Cr WNL  UA w/ > 100 WBCs, 20-50 RBCs, moderate leuks, 2+ bacteria. UCX pending  Rocephin+      Problem: tumor;  Location: bladder;  Severity: moderate-severe; Timing:unknown, Context: as above in HPI; Better/Worse: TBD, Associated s/s:no hydro, cr okay     Temp (24hrs), Av °F (36.7 °C), Min:97.5 °F (36.4 °C), Max:98.4 °F (36.9 °C)    Urinary Status: Coleman  Creatinine   Date/Time Value Ref Range Status   2020 05:00 AM 1. 02 0.70 - 1.30 MG/DL Final   06/08/2020 05:02 PM 1.24 0.70 - 1.30 MG/DL Final   02/27/2016 03:35 AM 1.14 0.70 - 1.30 MG/DL Final   02/26/2016 03:05 PM 1.23 0.70 - 1.30 MG/DL Final   09/30/2014 03:52 AM 1.16 (H) 0.45 - 1.15 MG/DL Final     Current Antimicrobial Therapy (168h ago, onward)    Ordered     Start Stop    06/09/20 0033  cefTRIAXone (ROCEPHIN) 1 g in 0.9% sodium chloride (MBP/ADV) 50 mL  1 g,   IntraVENous,   EVERY 24 HOURS      06/09/20 2100 06/11/20 2059        Key Anti-Platelet Anticoagulant Meds             clopidogrel (PLAVIX) 75 mg tablet (Taking) Take 75 mg by mouth daily.         Diet: DIET CARDIAC Regular -       Labs     Lab Results   Component Value Date/Time    Lactic acid 1.4 06/08/2020 05:02 PM    Lactic acid 2.4 (H) 09/27/2014 04:15 PM    WBC 8.6 06/09/2020 05:00 AM    HCT 42.6 06/09/2020 05:00 AM    PLATELET 440 19/85/9389 05:00 AM    Sodium 138 06/09/2020 05:00 AM    Potassium 3.4 (L) 06/09/2020 05:00 AM    Chloride 104 06/09/2020 05:00 AM    CO2 29 06/09/2020 05:00 AM    BUN 15 06/09/2020 05:00 AM    Creatinine 1.02 06/09/2020 05:00 AM    Glucose 111 (H) 06/09/2020 05:00 AM    Calcium 8.7 06/09/2020 05:00 AM    Magnesium 2.1 06/09/2020 05:00 AM    INR 1.1 02/26/2016 03:05 PM     UA:   Lab Results   Component Value Date/Time    Color YELLOW/STRAW 06/08/2020 06:26 PM    Appearance CLOUDY (A) 06/08/2020 06:26 PM    Specific gravity 1.010 06/08/2020 06:26 PM    Specific gravity 1.017 02/26/2016 05:05 PM    pH (UA) 7.0 06/08/2020 06:26 PM    Protein 100 (A) 06/08/2020 06:26 PM    Glucose Negative 06/08/2020 06:26 PM    Ketone Negative 06/08/2020 06:26 PM    Bilirubin Negative 06/08/2020 06:26 PM    Urobilinogen 0.2 06/08/2020 06:26 PM    Nitrites Negative 06/08/2020 06:26 PM    Leukocyte Esterase MODERATE (A) 06/08/2020 06:26 PM    Epithelial cells FEW 06/08/2020 06:26 PM    Bacteria 2+ (A) 06/08/2020 06:26 PM    WBC >100 (H) 06/08/2020 06:26 PM    RBC 20-50 06/08/2020 06:26 PM     Imaging Results for orders placed during the hospital encounter of 06/08/20   CT ABD PELV W CONT    Narrative EXAM: CT ABD PELV W CONT    INDICATION: abdominal pain, constipation    COMPARISON: None. CONTRAST: 100 mL of Isovue-370. TECHNIQUE:   Following the uneventful intravenous administration of contrast, thin axial  images were obtained through the abdomen and pelvis. Coronal and sagittal  reconstructions were generated. Oral contrast was not administered. CT dose  reduction was achieved through use of a standardized protocol tailored for this  examination and automatic exposure control for dose modulation. Adaptive  statistical iterative reconstruction (ASIR) was utilized. FINDINGS:   LOWER THORAX: Calcified granuloma left lower lobe. Lungs are otherwise clear. The heart is normal in size without pericardial effusion. Coronary artery  calcifications are noted. LIVER: Subcentimeter subcapsular cyst at the dome of the right lobe. Otherwise  unremarkable. BILIARY TREE: Gallbladder is within normal limits. CBD is not dilated. SPLEEN: Calcified granulomata. Otherwise unremarkable. PANCREAS: No mass or ductal dilatation. ADRENALS: Unremarkable. KIDNEYS AND URETERS: Bilateral pelvocaliectasis with no mass or calculus. No  ureteral dilation or ureteral lesion identified. STOMACH: Unremarkable. SMALL BOWEL: No dilatation or wall thickening. COLON: No dilatation or wall thickening. Moderate rectocele fecal retention. APPENDIX: Unremarkable. PERITONEUM: No ascites or pneumoperitoneum. RETROPERITONEUM: Atherosclerotic calcification without aneurysm or dissection. No enlarged lymphadenopathy. REPRODUCTIVE ORGANS: Prostate is enlarged with projection into the urinary  bladder base. There is some focal hyperenhancement in the lateral left  peripheral zone (3, 71) of the prostate. Seminal vesicles appear unremarkable.   URINARY BLADDER: Avidly enhancing mass projecting from the anterolateral left  urinary bladder wall measures 3.4 x 4.8 x 3.7 cm. No stones or other mass  evident. BONES: Degenerative spine change. No acute fracture or aggressive lesion. Osteoarthritic changes of the hips. ABDOMINAL WALL: No mass or hernia. ADDITIONAL COMMENTS: N/A      Impression IMPRESSION:    1. Anterior left bladder wall mass suspicious for urothelial neoplasm such as  transitional cell carcinoma. 2. Moderate rectosigmoid fecal retention. 3. Lateral right peripheral zone prostate hyperenhancement could reflect  prostate neoplasm. Correlate with clinical and laboratory data and consider  dedicated prostate MRI imaging as clinically appropriate. 4. Bilateral pelvocaliectasis likely reflecting chronic urinary bladder outlet  obstruction, likely secondary to prostamegaly. 5. Incidentals as above including coronary artery disease. US Results (most recent):  No results found for this or any previous visit. Cultures     All Micro Results     Procedure Component Value Units Date/Time    CULTURE, URINE [[id number]] Collected:  06/08/20 1826    Order Status:  Completed Specimen:  Cath Urine Updated:  06/08/20 2117    URINE CULTURE HOLD SAMPLE [[id number]] Collected:  06/08/20 1826    Order Status:  Completed Specimen:  Urine from Serum Updated:  06/08/20 1829     Urine culture hold       Urine on hold in Microbiology dept for 2 days. If unpreserved urine is submitted, it cannot be used for addtional testing after 24 hours, recollection will be required.                  Past History: (Complete 2+/3 areas)   No Known Allergies   Current Facility-Administered Medications   Medication Dose Route Frequency    cefTRIAXone (ROCEPHIN) 1 g in 0.9% sodium chloride (MBP/ADV) 50 mL  1 g IntraVENous Q24H    dilTIAZem ER (CARDIZEM CD) capsule 240 mg  240 mg Oral DAILY    dutasteride (AVODART) capsule 0.5 mg  0.5 mg Oral DAILY    HYDROcodone-acetaminophen (NORCO) 5-325 mg per tablet 1 Tab  1 Tab Oral Q4H PRN    tamsulosin (FLOMAX) capsule 0.4 mg  0.4 mg Oral DAILY    naloxone (NARCAN) injection 0.4 mg  0.4 mg IntraVENous PRN    zolpidem (AMBIEN) tablet 5 mg  5 mg Oral QHS PRN    dextrose 5% - 0.45% NaCl with KCl 20 mEq/L infusion  75 mL/hr IntraVENous CONTINUOUS    acetaminophen (TYLENOL) tablet 650 mg  650 mg Oral Q4H PRN    HYDROmorphone (DILAUDID) syringe 0.5 mg  0.5 mg IntraVENous Q4H PRN    diphenhydrAMINE (BENADRYL) capsule 25 mg  25 mg Oral Q4H PRN    ondansetron (ZOFRAN) injection 4 mg  4 mg IntraVENous Q4H PRN    heparin (porcine) injection 5,000 Units  5,000 Units SubCUTAneous Q8H    Prior to Admission medications    Medication Sig Start Date End Date Taking? Authorizing Provider   hydroCHLOROthiazide (HYDRODIURIL) 25 mg tablet Take 25 mg by mouth daily. Yes Other, MD Sam   dutasteride (AVODART) 0.5 mg capsule Take 0.5 mg by mouth daily. Yes Dacia, MD Sam   hydrocodone-acetaminophen (NORCO) 5-325 mg per tablet Take 1 tablet by mouth every four (4) hours as needed for Pain. Yes Dacia, MD Sam   tamsulosin (FLOMAX) 0.4 mg capsule Take 0.4 mg by mouth daily. Yes Dacia, MD Sam   diltiazem hcl 240 mg Tb24 Take 240 mg by mouth daily. Yes Dacia, MD Sam   atorvastatin (LIPITOR) 20 mg tablet Take 20 mg by mouth daily. Yes Sam Pedesron MD   clopidogrel (PLAVIX) 75 mg tablet Take 75 mg by mouth daily. Yes Dacia, MD Sam        PMHx:  has a past medical history of A-fib ([facility] [address] Arthritis, Enlarged prostate, High cholesterol, completed stroke, and Hypertension. PSurgHx:  has a past surgical history that includes hx hernia repair and hx tonsillectomy. PSocHx:  reports that he has never smoked. He has never used smokeless tobacco. He reports that he does not drink alcohol or use drugs.    ROS:  (Complete - 10 systems) - DENIES: Weightloss (Constitutional), Dry mouth (ENMT), Chest pain (CV), SOB (Respiratory), Constipation (GI), Weakness (MS), Pallor (Skin), TIA Sx (Neuro), Confusion (Psych), Easy bruising (Heme)    Physical Exam: (Comprehesive - 8+ 1995 Systems)     (1) Constitutional:  FIO2:   on SpO2: O2 Sat (%): 97 %  O2 Device: Room air    Patient Vitals for the past 24 hrs:   BP Temp Pulse Resp SpO2 Height Weight   06/09/20 0743 166/74 98 °F (36.7 °C) 61 16 97 %     06/09/20 0407 153/73 98 °F (36.7 °C) 60 16 97 %     06/09/20 0016 179/76 98.4 °F (36.9 °C) 66 16 97 %     06/08/20 2328     95 %     06/08/20 2100 139/78   15 96 %     06/08/20 1900 142/67   14 93 %     06/08/20 1648 168/67 97.5 °F (36.4 °C) 87 16 97 % 5' 8\" (1.727 m) 68 kg (150 lb)   06/08/20 1630 175/65    95 %         Date 06/08/20 0700 - 06/09/20 0659 06/09/20 0700 - 06/10/20 0659   Shift 2827-5297 1250-9092 24 Hour Total 3988-7445 1468-6808 24 Hour Total   INTAKE   I.V.(mL/kg/hr)  1000(1.2) 1000(0.6) 375  375     I.V.    375  375     Volume (sodium chloride 0.9 % bolus infusion 1,000 mL)  1000 1000      Shift Total(mL/kg)  1000(14.7) 1000(14.7) 375(5.5)  375(5.5)   OUTPUT   Urine(mL/kg/hr)  1450(1.8) 1450(0.9)        Urine Output (mL) (Urinary Catheter 06/08/20 Coleman)  1450 1450      Shift Total(mL/kg)  1450(21.3) 1450(21.3)      NET  -450 -450 375  375   Weight (kg) 68 68 68 68 68 68      (2) ENMT:   moist mucous membranes, normal sinuses   (3) Respiratory:  breathing easily, no distress   (4) GI:  no abdominal masses, tenderness   (5) :   normal   (6) Lymphatic:  no adenopathy, neck supple   (7) Muscloskeletal:  no gross deformity, normal ROM   (8) Skin:  no rash, warm & dry   (9) Neuro:  no focal deficits, normal speech      Signed By: Amarilis Fofana NP  - June 9, 2020

## 2020-06-09 NOTE — PROGRESS NOTES
Case Management Note    Patient signed Britany Adams 130 letter. Received a copy and signed copy on chart.     JEN Davis

## 2020-06-09 NOTE — PROGRESS NOTES
TRANSFER - OUT REPORT:    Verbal report given to ZAIDA Quiros(name) on Bishop Velarde         Report consisted of patients Situation, Background, Assessment and   Recommendations(SBAR). Information from the following report(s) SBAR and MAR was reviewed with the receiving nurse. Lines:   Peripheral IV 06/08/20 (Active)   Site Assessment Clean, dry, & intact 6/9/2020  7:56 AM   Phlebitis Assessment 0 6/9/2020  7:56 AM   Infiltration Assessment 0 6/9/2020  7:56 AM   Dressing Status Clean, dry, & intact 6/9/2020  7:56 AM   Dressing Type Transparent 6/9/2020  7:56 AM   Hub Color/Line Status Patent; Infusing 6/9/2020  7:56 AM   Alcohol Cap Used Yes 6/9/2020  7:56 AM        Opportunity for questions and clarification was provided.

## 2020-06-09 NOTE — DISCHARGE SUMMARY
Hospitalist Discharge Summary     Patient ID:    Francheska Hurd  [id number]  80 y.o.  1934    Admit date of service: 6/8/2020    Discharge date of service: 6/9/2020    Admission Diagnoses: Bladder mass [N32.89]    Chronic Diagnoses:    Problem List as of 6/9/2020 Date Reviewed: 6/8/2020          Codes Class Noted - Resolved    * (Principal) Bladder mass ICD-10-CM: N32.89  ICD-9-CM: 596.89  6/8/2020 - Present        Constipated ICD-10-CM: K59.00  ICD-9-CM: 564.00  6/8/2020 - Present        PUD (peptic ulcer disease) (Chronic) ICD-10-CM: K27.9  ICD-9-CM: 533.90  9/29/2014 - Present        Hx of completed stroke (Chronic) ICD-10-CM: Z86.73  ICD-9-CM: V12.54  Unknown - Present        Enlarged prostate (Chronic) ICD-10-CM: N40.0  ICD-9-CM: 600.00  Unknown - Present        Hypertension (Chronic) ICD-10-CM: I10  ICD-9-CM: 401.9  Unknown - Present        High cholesterol (Chronic) ICD-10-CM: E78.00  ICD-9-CM: 272.0  Unknown - Present        Arthritis (Chronic) ICD-10-CM: M19.90  ICD-9-CM: 716.90  Unknown - Present        Anemia (Chronic) ICD-10-CM: D64.9  ICD-9-CM: 285.9  9/27/2014 - Present        CKD (chronic kidney disease) stage 3, GFR 30-59 ml/min (HCC) (Chronic) ICD-10-CM: N18.3  ICD-9-CM: 585.3  9/27/2014 - Present        A-fib ([address] (Chronic) ICD-10-CM: I48.91  ICD-9-CM: 427.31  Unknown - Present        RESOLVED: Encephalomalacia (Chronic) ICD-10-CM: G93.89  ICD-9-CM: 348.89  2/26/2016 - 6/8/2020        RESOLVED: Cerebral infarction, chronic (Chronic) ICD-10-CM: Z86.73  ICD-9-CM: V12.54  2/26/2016 - 6/8/2020        RESOLVED: Colonic polyp ICD-10-CM: A73.5  ICD-9-CM: 211.3  9/30/2014 - 2/27/2016        RESOLVED: GI bleed ICD-10-CM: K92.2  ICD-9-CM: 578.9  9/27/2014 - 2/27/2016        RESOLVED: Dehydration ICD-10-CM: E86.0  ICD-9-CM: 276.51  9/27/2014 - 2/27/2016        RESOLVED: ARF (acute renal failure) ([facility] ICD-10-CM: N17.9  ICD-9-CM: 584.9  9/27/2014 - 2/27/2016        RESOLVED: Sinus tachycardia ICD-10-CM: R00.0  ICD-9-CM: 427.89  9/27/2014 - 2/27/2016        RESOLVED: Abdominal pain ICD-10-CM: R10.9  ICD-9-CM: 789.00  9/27/2014 - 2/27/2016        RESOLVED: Dizziness ICD-10-CM: R42  ICD-9-CM: 780.4  9/27/2014 - 6/8/2020        RESOLVED: Melena ICD-10-CM: K92.1  ICD-9-CM: 578.1  9/27/2014 - 2/27/2016              Discharge Medications:   Current Discharge Medication List      START taking these medications    Details   polyethylene glycol (MIRALAX) 17 gram packet Take 1 Packet by mouth two (2) times a day. Qty: 10 Each, Refills: 0      cefdinir (OMNICEF) 300 mg capsule Take 1 Cap by mouth two (2) times a day. Qty: 10 Cap, Refills: 0         CONTINUE these medications which have NOT CHANGED    Details   hydroCHLOROthiazide (HYDRODIURIL) 25 mg tablet Take 25 mg by mouth daily. dutasteride (AVODART) 0.5 mg capsule Take 0.5 mg by mouth daily. hydrocodone-acetaminophen (NORCO) 5-325 mg per tablet Take 1 tablet by mouth every four (4) hours as needed for Pain.      tamsulosin (FLOMAX) 0.4 mg capsule Take 0.4 mg by mouth daily. diltiazem hcl 240 mg Tb24 Take 240 mg by mouth daily. atorvastatin (LIPITOR) 20 mg tablet Take 20 mg by mouth daily. clopidogrel (PLAVIX) 75 mg tablet Take 75 mg by mouth daily. Follow up Care:    Alma Yoo MD in 1-2 weeks  2. Urology     Diet:  Cardiac Diet    Disposition:  Home. Advanced Directive:    Discharge Exam:  See today's note.     CONSULTATIONS: Urology    Significant Diagnostic Studies:   Recent Labs     06/09/20  0500 06/08/20  1702   WBC 8.6 11.2*   HGB 14.4 14.6   HCT 42.6 44.9    235     Recent Labs     06/09/20  0500 06/08/20  1702    139   K 3.4* 3.3*    101   CO2 29 30   BUN 15 22*   CREA 1.02 1.24   * 145*   CA 8.7 9.4   MG 2.1  --    PHOS 3.1  --      Recent Labs     06/09/20  0500 06/08/20  1702   ALT 17 21   AP 46 50   TBILI 1.1* 1.0   TP 6.2* 6.9   ALB 3.5 4.2   GLOB 2.7 2.7   LPSE  --  58*     No results for input(s): INR, PTP, APTT, INREXT in the last 72 hours. No results for input(s): FE, TIBC, PSAT, FERR in the last 72 hours. No results for input(s): PH, PCO2, PO2 in the last 72 hours. No results for input(s): CPK, CKMB in the last 72 hours. No lab exists for component: TROPONINI  Lab Results   Component Value Date/Time    Glucose (POC) 192 (H) 07/20/2019 10:34 AM    Glucose (POC) 224 (H) 02/28/2016 11:28 AM    Glucose (POC) 111 (H) 02/28/2016 08:22 AM    Glucose (POC) 77 02/28/2016 07:41 AM    Glucose (POC) 124 (H) 02/15/2014 10:50 AM             HOSPITAL COURSE & TREATMENT RENDERED:   1.  Bladder mass / Enlarged prostate - POA, new mass. Concern of cancer.  evaluated by urology and will have outpatient FU and possible surgery. continue Avodart and flomax.       2. Hx of completed stroke / High cholesterol - Continue atorvastatin. Cont plavix     3. Hypertension / Hx A-fib - continue Diltiazem.  He does not report anticoagulation.     4.  Constipated / PUD (peptic ulcer disease) - resolved. Advised to take laxatives and high fiber diet.   Not on PPI. Monitor. Bowel regimen      5. Anemia - Mild and may worsen with hydration.  Likely due to chronic disease.     6. Arthritis - Tylenol prn.  No NSAIDS     7.  UTI(POA). Continue omnifcef        Discharged in improved condition.     Spent 35 minutes    Signed:  Daja Ahuja MD  6/9/2020  1:24 PM

## 2020-06-09 NOTE — PROGRESS NOTES
Pharmacist Discharge Medication Reconciliation    Discharge Provider:  Dr. Sal Arizmendi       Discharge Medications:      My Medications        START taking these medications        Instructions Each Dose to Equal Morning Noon Evening Bedtime   cefdinir 300 mg capsule  Commonly known as:  OMNICEF    Your last dose was: Your next dose is: Take 1 Cap by mouth two (2) times a day. 300 mg                 polyethylene glycol 17 gram packet  Commonly known as:  MIRALAX    Your last dose was: Your next dose is: Take 1 Packet by mouth two (2) times a day. 17 g                        CONTINUE taking these medications        Instructions Each Dose to Equal Morning Noon Evening Bedtime   atorvastatin 20 mg tablet  Commonly known as:  LIPITOR    Your last dose was: Your next dose is: Take 20 mg by mouth daily. 20 mg                 Avodart 0.5 mg capsule  Generic drug:  dutasteride    Your last dose was: Your next dose is: Take 0.5 mg by mouth daily. 0.5 mg                 clopidogreL 75 mg Tab  Commonly known as:  PLAVIX    Your last dose was: Your next dose is: Take 75 mg by mouth daily. 75 mg                 dilTIAZem  mg tablet  Commonly known as:  CARDIZEM LA    Your last dose was: Your next dose is: Take 240 mg by mouth daily. 240 mg                 hydroCHLOROthiazide 25 mg tablet  Commonly known as:  HYDRODIURIL    Your last dose was: Your next dose is: Take 25 mg by mouth daily. 25 mg                 HYDROcodone-acetaminophen 5-325 mg per tablet  Commonly known as:  Liane Ashing    Your last dose was: Your next dose is: Take 1 tablet by mouth every four (4) hours as needed for Pain. 1 Tab                 tamsulosin 0.4 mg capsule  Commonly known as:  FLOMAX    Your last dose was: Your next dose is: Take 0.4 mg by mouth daily.    0.4 mg                           Where to Get Your Medications        Information on where to get these meds will be given to you by the nurse or doctor.     Ask your nurse or doctor about these medications  cefdinir 300 mg capsule  polyethylene glycol 17 gram packet          The patient's chart, MAR, and AVS were reviewed by   RADHA Mock,   Contact: [phone]

## 2020-06-11 VITALS
HEART RATE: 68 BPM | DIASTOLIC BLOOD PRESSURE: 67 MMHG | HEIGHT: 68 IN | RESPIRATION RATE: 16 BRPM | TEMPERATURE: 98.1 F | WEIGHT: 150 LBS | OXYGEN SATURATION: 97 % | BODY MASS INDEX: 22.73 KG/M2 | SYSTOLIC BLOOD PRESSURE: 145 MMHG

## 2020-06-11 NOTE — PROGRESS NOTES
6:30 PM  [facility] out to her and she stated that he did. She said that they will see her  tomorrow. LEV Terrazas       I spoke with Felice Foy RN she had received a phone call from pt's wife stating that home health had not been set up for her . I called the pt's wife, Morene Shone [phone] and we discussed options moving forward. I explained to her that Jg Patterson would be able to come out and see her  tomorrow morning. I could not give her a specific time and she stated that she was fine with that. [address] will reach out to [employer]. Carrie Kline called [facility] with [facility]. They are able to see the patient Saturday morning. I asked Morene Shone if that was acceptable or we could try and find another agency. She said, \"Saturday is fine, I want to wait for [facility]\". No other issues or concerns at this time.  LEV Terrazas

## 2020-06-12 ENCOUNTER — HOME HEALTH ADMISSION (OUTPATIENT)
Dept: HOME HEALTH SERVICES | Facility: HOME HEALTH | Age: 85
End: 2020-06-12
Payer: MEDICARE

## 2020-06-12 LAB
BACTERIA SPEC CULT: ABNORMAL
CC UR VC: ABNORMAL
SERVICE CMNT-IMP: ABNORMAL

## 2020-06-12 NOTE — PROGRESS NOTES
[facility] - I spoke with Vitor Age and a car is in route to the patient. Arrival time is 11:15. I called the pt's wife Jef Webster to let her know and she stated that she had already received a phone call from [facility] confirming visit for tomorrow. No other issues or concerns at this time.  LEV Montiel

## 2020-06-12 NOTE — PROGRESS NOTES
9:36 AM  [facility] Accepted. Will See pt Sat 6/13. Patient's wife is aware. Called her this morning to confirm. JEN Tomlinson    Case Management Note:    Patient will need to go home with ahuja will need RN home health care. They selected to go with [facility]. Referral sent. Await for response. Verbal Consent given for Choice.   JEN Tomlinson

## 2020-06-13 ENCOUNTER — HOME CARE VISIT (OUTPATIENT)
Dept: SCHEDULING | Facility: HOME HEALTH | Age: 85
End: 2020-06-13
Payer: MEDICARE

## 2020-06-13 PROCEDURE — G0299 HHS/HOSPICE OF RN EA 15 MIN: HCPCS

## 2020-06-13 PROCEDURE — 3331090002 HH PPS REVENUE DEBIT

## 2020-06-13 PROCEDURE — 3331090001 HH PPS REVENUE CREDIT

## 2020-06-13 PROCEDURE — 400013 HH SOC

## 2020-06-14 PROCEDURE — 3331090001 HH PPS REVENUE CREDIT

## 2020-06-14 PROCEDURE — 3331090002 HH PPS REVENUE DEBIT

## 2020-06-15 VITALS
RESPIRATION RATE: 16 BRPM | DIASTOLIC BLOOD PRESSURE: 72 MMHG | TEMPERATURE: 98.4 F | OXYGEN SATURATION: 99 % | HEART RATE: 68 BPM | SYSTOLIC BLOOD PRESSURE: 150 MMHG

## 2020-06-15 PROCEDURE — 3331090001 HH PPS REVENUE CREDIT

## 2020-06-15 PROCEDURE — 3331090002 HH PPS REVENUE DEBIT

## 2020-06-16 PROCEDURE — 3331090002 HH PPS REVENUE DEBIT

## 2020-06-16 PROCEDURE — 3331090001 HH PPS REVENUE CREDIT

## 2020-06-17 ENCOUNTER — HOME CARE VISIT (OUTPATIENT)
Dept: SCHEDULING | Facility: HOME HEALTH | Age: 85
End: 2020-06-17
Payer: MEDICARE

## 2020-06-17 PROCEDURE — 3331090001 HH PPS REVENUE CREDIT

## 2020-06-17 PROCEDURE — 3331090002 HH PPS REVENUE DEBIT

## 2020-06-17 PROCEDURE — G0300 HHS/HOSPICE OF LPN EA 15 MIN: HCPCS

## 2020-06-17 NOTE — PERIOP NOTES
PATIENT'S WIFE, WIL, CALLED AND MADE AWARE OF COVID-19 TESTING NEEDED TO BE DONE WITHIN 96 HOURS OF SURGERY. COVID-19 TESTING APPOINTMENT MADE FOR PATIENT. PATIENT'S WIFE  INSTRUCTED ON SELF QUARANTINE BETWEEN TESTING AND ARRIVAL TIME DAY OF SURGERY.

## 2020-06-18 ENCOUNTER — HOSPITAL ENCOUNTER (OUTPATIENT)
Dept: GENERAL RADIOLOGY | Age: 85
Discharge: HOME | End: 2020-06-18
Attending: UROLOGY
Payer: MEDICARE

## 2020-06-18 ENCOUNTER — HOSPITAL ENCOUNTER (OUTPATIENT)
Dept: PREADMISSION TESTING | Age: 85
Discharge: HOME | End: 2020-06-18
Payer: MEDICARE

## 2020-06-18 ENCOUNTER — HOSPITAL ENCOUNTER (OUTPATIENT)
Dept: PREADMISSION TESTING | Age: 85
Discharge: HOME | End: 2020-06-18
Attending: NURSE PRACTITIONER
Payer: MEDICARE

## 2020-06-18 VITALS
HEART RATE: 46 BPM | TEMPERATURE: 98 F | SYSTOLIC BLOOD PRESSURE: 138 MMHG | BODY MASS INDEX: 20.99 KG/M2 | HEIGHT: 68 IN | DIASTOLIC BLOOD PRESSURE: 68 MMHG | WEIGHT: 138.5 LBS | RESPIRATION RATE: 18 BRPM

## 2020-06-18 DIAGNOSIS — Z20.822 ENCOUNTER FOR LABORATORY TESTING FOR COVID-19 VIRUS: ICD-10-CM

## 2020-06-18 PROCEDURE — 3331090002 HH PPS REVENUE DEBIT

## 2020-06-18 PROCEDURE — 87635 SARS-COV-2 COVID-19 AMP PRB: CPT

## 2020-06-18 PROCEDURE — 93005 ELECTROCARDIOGRAM TRACING: CPT

## 2020-06-18 PROCEDURE — 3331090001 HH PPS REVENUE CREDIT

## 2020-06-18 PROCEDURE — 71046 X-RAY EXAM CHEST 2 VIEWS: CPT

## 2020-06-18 NOTE — PERIOP NOTES
Preoperative and medication instructions reviewed with patient and wife. Patient given  2 bottles of CHG soap. Instructions reviewed on use of CHG soap. Patient given SSI infection FAQS sheet, Patient and wife was given the opportunity to ask questions on the information provided. Information given to have cxr completed today. Information sheet given regarding arrival to [facility] on day of surgery. Heart rate 51 bl;ood pressure 145/66 Right Arm  Heart rate 46 ,  Blood pressure 138/68 Left arm denies any shortness of breath or lightlessness.  Advised to contact pcp if any problems or go to nearest ER, Wife and patient acknowledged understanding of information     NOTIFIED Pa[place]ster , REGARDING INABILITY TO OBTAIN U/A AND URINE CX DUE  [address] CATH

## 2020-06-19 ENCOUNTER — HOME CARE VISIT (OUTPATIENT)
Dept: SCHEDULING | Facility: HOME HEALTH | Age: 85
End: 2020-06-19
Payer: MEDICARE

## 2020-06-19 LAB
ATRIAL RATE: 45 BPM
CALCULATED P AXIS, ECG09: 29 DEGREES
CALCULATED R AXIS, ECG10: -22 DEGREES
CALCULATED T AXIS, ECG11: 30 DEGREES
DIAGNOSIS, 93000: NORMAL
P-R INTERVAL, ECG05: 238 MS
Q-T INTERVAL, ECG07: 446 MS
QRS DURATION, ECG06: 106 MS
QTC CALCULATION (BEZET), ECG08: 385 MS
SARS-COV-2, COV2NT: NOT DETECTED
VENTRICULAR RATE, ECG03: 45 BPM

## 2020-06-19 PROCEDURE — G0300 HHS/HOSPICE OF LPN EA 15 MIN: HCPCS

## 2020-06-19 PROCEDURE — 3331090002 HH PPS REVENUE DEBIT

## 2020-06-19 PROCEDURE — 3331090001 HH PPS REVENUE CREDIT

## 2020-06-20 PROCEDURE — 3331090002 HH PPS REVENUE DEBIT

## 2020-06-20 PROCEDURE — 3331090001 HH PPS REVENUE CREDIT

## 2020-06-21 ENCOUNTER — ANESTHESIA EVENT (OUTPATIENT)
Dept: SURGERY | Age: 85
End: 2020-06-21
Payer: MEDICARE

## 2020-06-21 VITALS
RESPIRATION RATE: 17 BRPM | HEART RATE: 60 BPM | SYSTOLIC BLOOD PRESSURE: 130 MMHG | TEMPERATURE: 98 F | OXYGEN SATURATION: 99 % | DIASTOLIC BLOOD PRESSURE: 70 MMHG

## 2020-06-21 VITALS
OXYGEN SATURATION: 98 % | RESPIRATION RATE: 17 BRPM | DIASTOLIC BLOOD PRESSURE: 69 MMHG | TEMPERATURE: 98 F | HEART RATE: 61 BPM | SYSTOLIC BLOOD PRESSURE: 133 MMHG

## 2020-06-21 PROCEDURE — 3331090002 HH PPS REVENUE DEBIT

## 2020-06-21 PROCEDURE — 3331090001 HH PPS REVENUE CREDIT

## 2020-06-22 ENCOUNTER — ANESTHESIA (OUTPATIENT)
Dept: SURGERY | Age: 85
End: 2020-06-22
Payer: MEDICARE

## 2020-06-22 ENCOUNTER — HOME CARE VISIT (OUTPATIENT)
Dept: HOME HEALTH SERVICES | Facility: HOME HEALTH | Age: 85
End: 2020-06-22
Payer: MEDICARE

## 2020-06-22 ENCOUNTER — HOSPITAL ENCOUNTER (OUTPATIENT)
Age: 85
Setting detail: OBSERVATION
Discharge: HOME HEALTH | End: 2020-06-23
Attending: UROLOGY | Admitting: UROLOGY
Payer: MEDICARE

## 2020-06-22 PROBLEM — C67.9 BLADDER CANCER (HCC): Status: ACTIVE | Noted: 2020-06-22

## 2020-06-22 PROCEDURE — 76010000131 HC OR TIME 2 TO 2.5 HR: Performed by: UROLOGY

## 2020-06-22 PROCEDURE — 77030018830 HC SOL IRR GLYC ICUM-A: Performed by: UROLOGY

## 2020-06-22 PROCEDURE — 74011250636 HC RX REV CODE- 250/636: Performed by: NURSE ANESTHETIST, CERTIFIED REGISTERED

## 2020-06-22 PROCEDURE — 77030005546 HC CATH URETH FOL 3W BARD -A: Performed by: UROLOGY

## 2020-06-22 PROCEDURE — 65270000029 HC RM PRIVATE

## 2020-06-22 PROCEDURE — 77030008684 HC TU ET CUF COVD -B: Performed by: ANESTHESIOLOGY

## 2020-06-22 PROCEDURE — 76060000035 HC ANESTHESIA 2 TO 2.5 HR: Performed by: UROLOGY

## 2020-06-22 PROCEDURE — 74011000250 HC RX REV CODE- 250: Performed by: NURSE ANESTHETIST, CERTIFIED REGISTERED

## 2020-06-22 PROCEDURE — 99218 HC RM OBSERVATION: CPT

## 2020-06-22 PROCEDURE — 77030040361 HC SLV COMPR DVT MDII -B: Performed by: UROLOGY

## 2020-06-22 PROCEDURE — 77030011640 HC PAD GRND REM COVD -A: Performed by: UROLOGY

## 2020-06-22 PROCEDURE — 77030018836 HC SOL IRR NACL ICUM -A

## 2020-06-22 PROCEDURE — 74011250636 HC RX REV CODE- 250/636: Performed by: ANESTHESIOLOGY

## 2020-06-22 PROCEDURE — 74011250636 HC RX REV CODE- 250/636

## 2020-06-22 PROCEDURE — 77030021707 HC SET IRR FLD WRMR 3M -B: Performed by: UROLOGY

## 2020-06-22 PROCEDURE — 77030018846 HC SOL IRR STRL H20 ICUM -A: Performed by: UROLOGY

## 2020-06-22 PROCEDURE — 76210000016 HC OR PH I REC 1 TO 1.5 HR: Performed by: UROLOGY

## 2020-06-22 PROCEDURE — 74011250637 HC RX REV CODE- 250/637: Performed by: UROLOGY

## 2020-06-22 PROCEDURE — 87086 URINE CULTURE/COLONY COUNT: CPT

## 2020-06-22 PROCEDURE — 3331090001 HH PPS REVENUE CREDIT

## 2020-06-22 PROCEDURE — 77030012890

## 2020-06-22 PROCEDURE — 3331090002 HH PPS REVENUE DEBIT

## 2020-06-22 PROCEDURE — 77030011274 HC ELECTRD CUT LP RWOL -F: Performed by: UROLOGY

## 2020-06-22 PROCEDURE — 74011000258 HC RX REV CODE- 258: Performed by: UROLOGY

## 2020-06-22 PROCEDURE — 77030040922 HC BLNKT HYPOTHRM STRY -A

## 2020-06-22 PROCEDURE — 77030027138 HC INCENT SPIROMETER -A

## 2020-06-22 PROCEDURE — 77030026438 HC STYL ET INTUB CARD -A: Performed by: ANESTHESIOLOGY

## 2020-06-22 PROCEDURE — 88307 TISSUE EXAM BY PATHOLOGIST: CPT

## 2020-06-22 RX ORDER — DEXTROSE, SODIUM CHLORIDE, AND POTASSIUM CHLORIDE 5; .45; .15 G/100ML; G/100ML; G/100ML
INJECTION INTRAVENOUS
Status: DISPENSED
Start: 2020-06-22 | End: 2020-06-22

## 2020-06-22 RX ORDER — SODIUM CHLORIDE 0.9 % (FLUSH) 0.9 %
5-40 SYRINGE (ML) INJECTION AS NEEDED
Status: DISCONTINUED | OUTPATIENT
Start: 2020-06-22 | End: 2020-06-22 | Stop reason: HOSPADM

## 2020-06-22 RX ORDER — FENTANYL CITRATE 50 UG/ML
INJECTION, SOLUTION INTRAMUSCULAR; INTRAVENOUS AS NEEDED
Status: DISCONTINUED | OUTPATIENT
Start: 2020-06-22 | End: 2020-06-22 | Stop reason: HOSPADM

## 2020-06-22 RX ORDER — PROPOFOL 10 MG/ML
INJECTION, EMULSION INTRAVENOUS AS NEEDED
Status: DISCONTINUED | OUTPATIENT
Start: 2020-06-22 | End: 2020-06-22 | Stop reason: HOSPADM

## 2020-06-22 RX ORDER — NALOXONE HYDROCHLORIDE 0.4 MG/ML
0.4 INJECTION, SOLUTION INTRAMUSCULAR; INTRAVENOUS; SUBCUTANEOUS AS NEEDED
Status: DISCONTINUED | OUTPATIENT
Start: 2020-06-22 | End: 2020-06-23 | Stop reason: HOSPADM

## 2020-06-22 RX ORDER — DEXTROSE MONOHYDRATE AND SODIUM CHLORIDE 5; .45 G/100ML; G/100ML
75 INJECTION, SOLUTION INTRAVENOUS CONTINUOUS
Status: DISCONTINUED | OUTPATIENT
Start: 2020-06-22 | End: 2020-06-23 | Stop reason: HOSPADM

## 2020-06-22 RX ORDER — SODIUM CHLORIDE, SODIUM LACTATE, POTASSIUM CHLORIDE, CALCIUM CHLORIDE 600; 310; 30; 20 MG/100ML; MG/100ML; MG/100ML; MG/100ML
INJECTION, SOLUTION INTRAVENOUS
Status: DISCONTINUED | OUTPATIENT
Start: 2020-06-22 | End: 2020-06-22 | Stop reason: HOSPADM

## 2020-06-22 RX ORDER — MORPHINE SULFATE 2 MG/ML
INJECTION, SOLUTION INTRAMUSCULAR; INTRAVENOUS AS NEEDED
Status: DISCONTINUED | OUTPATIENT
Start: 2020-06-22 | End: 2020-06-22 | Stop reason: HOSPADM

## 2020-06-22 RX ORDER — EPHEDRINE SULFATE/0.9% NACL/PF 50 MG/5 ML
SYRINGE (ML) INTRAVENOUS AS NEEDED
Status: DISCONTINUED | OUTPATIENT
Start: 2020-06-22 | End: 2020-06-22 | Stop reason: HOSPADM

## 2020-06-22 RX ORDER — FENTANYL CITRATE 50 UG/ML
25 INJECTION, SOLUTION INTRAMUSCULAR; INTRAVENOUS
Status: COMPLETED | OUTPATIENT
Start: 2020-06-22 | End: 2020-06-22

## 2020-06-22 RX ORDER — SODIUM CHLORIDE 0.9 % (FLUSH) 0.9 %
5-40 SYRINGE (ML) INJECTION AS NEEDED
Status: DISCONTINUED | OUTPATIENT
Start: 2020-06-22 | End: 2020-06-23 | Stop reason: HOSPADM

## 2020-06-22 RX ORDER — LIDOCAINE HYDROCHLORIDE 10 MG/ML
0.1 INJECTION, SOLUTION EPIDURAL; INFILTRATION; INTRACAUDAL; PERINEURAL AS NEEDED
Status: DISCONTINUED | OUTPATIENT
Start: 2020-06-22 | End: 2020-06-22 | Stop reason: HOSPADM

## 2020-06-22 RX ORDER — ACETAMINOPHEN 325 MG/1
650 TABLET ORAL
Status: DISCONTINUED | OUTPATIENT
Start: 2020-06-22 | End: 2020-06-23 | Stop reason: HOSPADM

## 2020-06-22 RX ORDER — LIDOCAINE HYDROCHLORIDE 20 MG/ML
INJECTION, SOLUTION EPIDURAL; INFILTRATION; INTRACAUDAL; PERINEURAL AS NEEDED
Status: DISCONTINUED | OUTPATIENT
Start: 2020-06-22 | End: 2020-06-22 | Stop reason: HOSPADM

## 2020-06-22 RX ORDER — SODIUM CHLORIDE 0.9 % (FLUSH) 0.9 %
5-40 SYRINGE (ML) INJECTION EVERY 8 HOURS
Status: DISCONTINUED | OUTPATIENT
Start: 2020-06-22 | End: 2020-06-23 | Stop reason: HOSPADM

## 2020-06-22 RX ORDER — SUCCINYLCHOLINE CHLORIDE 20 MG/ML
INJECTION INTRAMUSCULAR; INTRAVENOUS AS NEEDED
Status: DISCONTINUED | OUTPATIENT
Start: 2020-06-22 | End: 2020-06-22 | Stop reason: HOSPADM

## 2020-06-22 RX ORDER — ONDANSETRON 2 MG/ML
INJECTION INTRAMUSCULAR; INTRAVENOUS AS NEEDED
Status: DISCONTINUED | OUTPATIENT
Start: 2020-06-22 | End: 2020-06-22 | Stop reason: HOSPADM

## 2020-06-22 RX ORDER — CEFAZOLIN SODIUM/WATER 2 G/20 ML
2 SYRINGE (ML) INTRAVENOUS ONCE
Status: DISCONTINUED | OUTPATIENT
Start: 2020-06-22 | End: 2020-06-22 | Stop reason: HOSPADM

## 2020-06-22 RX ORDER — DEXAMETHASONE SODIUM PHOSPHATE 4 MG/ML
INJECTION, SOLUTION INTRA-ARTICULAR; INTRALESIONAL; INTRAMUSCULAR; INTRAVENOUS; SOFT TISSUE AS NEEDED
Status: DISCONTINUED | OUTPATIENT
Start: 2020-06-22 | End: 2020-06-22 | Stop reason: HOSPADM

## 2020-06-22 RX ORDER — ONDANSETRON 2 MG/ML
4 INJECTION INTRAMUSCULAR; INTRAVENOUS AS NEEDED
Status: DISCONTINUED | OUTPATIENT
Start: 2020-06-22 | End: 2020-06-22 | Stop reason: HOSPADM

## 2020-06-22 RX ORDER — SODIUM CHLORIDE, SODIUM LACTATE, POTASSIUM CHLORIDE, CALCIUM CHLORIDE 600; 310; 30; 20 MG/100ML; MG/100ML; MG/100ML; MG/100ML
25 INJECTION, SOLUTION INTRAVENOUS CONTINUOUS
Status: DISCONTINUED | OUTPATIENT
Start: 2020-06-22 | End: 2020-06-22 | Stop reason: HOSPADM

## 2020-06-22 RX ORDER — LEVOFLOXACIN 500 MG/1
500 TABLET, FILM COATED ORAL EVERY 24 HOURS
Status: DISCONTINUED | OUTPATIENT
Start: 2020-06-23 | End: 2020-06-23

## 2020-06-22 RX ORDER — GLYCOPYRROLATE 0.2 MG/ML
INJECTION INTRAMUSCULAR; INTRAVENOUS AS NEEDED
Status: DISCONTINUED | OUTPATIENT
Start: 2020-06-22 | End: 2020-06-22 | Stop reason: HOSPADM

## 2020-06-22 RX ORDER — LEVOFLOXACIN 5 MG/ML
INJECTION, SOLUTION INTRAVENOUS AS NEEDED
Status: DISCONTINUED | OUTPATIENT
Start: 2020-06-22 | End: 2020-06-22 | Stop reason: HOSPADM

## 2020-06-22 RX ORDER — SODIUM CHLORIDE 0.9 % (FLUSH) 0.9 %
5-40 SYRINGE (ML) INJECTION EVERY 8 HOURS
Status: DISCONTINUED | OUTPATIENT
Start: 2020-06-22 | End: 2020-06-22 | Stop reason: HOSPADM

## 2020-06-22 RX ORDER — PHENYLEPHRINE HCL IN 0.9% NACL 0.4MG/10ML
SYRINGE (ML) INTRAVENOUS AS NEEDED
Status: DISCONTINUED | OUTPATIENT
Start: 2020-06-22 | End: 2020-06-22 | Stop reason: HOSPADM

## 2020-06-22 RX ORDER — HYDROMORPHONE HYDROCHLORIDE 1 MG/ML
0.2 INJECTION, SOLUTION INTRAMUSCULAR; INTRAVENOUS; SUBCUTANEOUS
Status: DISCONTINUED | OUTPATIENT
Start: 2020-06-22 | End: 2020-06-22 | Stop reason: HOSPADM

## 2020-06-22 RX ADMIN — DEXTROSE MONOHYDRATE AND SODIUM CHLORIDE 75 ML/HR: 5; .45 INJECTION, SOLUTION INTRAVENOUS at 17:20

## 2020-06-22 RX ADMIN — LIDOCAINE HYDROCHLORIDE 60 MG: 20 INJECTION, SOLUTION EPIDURAL; INFILTRATION; INTRACAUDAL; PERINEURAL at 08:39

## 2020-06-22 RX ADMIN — Medication 10 MG: at 09:06

## 2020-06-22 RX ADMIN — DEXAMETHASONE SODIUM PHOSPHATE 4 MG: 4 INJECTION, SOLUTION INTRAMUSCULAR; INTRAVENOUS at 08:51

## 2020-06-22 RX ADMIN — Medication 40 MCG: at 09:50

## 2020-06-22 RX ADMIN — SUCCINYLCHOLINE CHLORIDE 160 MG: 20 INJECTION, SOLUTION INTRAMUSCULAR; INTRAVENOUS at 08:41

## 2020-06-22 RX ADMIN — SODIUM CHLORIDE, SODIUM LACTATE, POTASSIUM CHLORIDE, AND CALCIUM CHLORIDE 25 ML/HR: 600; 310; 30; 20 INJECTION, SOLUTION INTRAVENOUS at 08:13

## 2020-06-22 RX ADMIN — FENTANYL CITRATE 25 MCG: 50 INJECTION, SOLUTION INTRAMUSCULAR; INTRAVENOUS at 11:01

## 2020-06-22 RX ADMIN — ONDANSETRON HYDROCHLORIDE 4 MG: 2 INJECTION, SOLUTION INTRAMUSCULAR; INTRAVENOUS at 10:20

## 2020-06-22 RX ADMIN — ACETAMINOPHEN 650 MG: 325 TABLET ORAL at 17:19

## 2020-06-22 RX ADMIN — FENTANYL CITRATE 25 MCG: 50 INJECTION, SOLUTION INTRAMUSCULAR; INTRAVENOUS at 11:08

## 2020-06-22 RX ADMIN — GLYCOPYRROLATE 0.2 MG: 0.2 INJECTION, SOLUTION INTRAMUSCULAR; INTRAVENOUS at 08:51

## 2020-06-22 RX ADMIN — FENTANYL CITRATE 25 MCG: 50 INJECTION, SOLUTION INTRAMUSCULAR; INTRAVENOUS at 11:30

## 2020-06-22 RX ADMIN — FENTANYL CITRATE 50 MCG: 50 INJECTION, SOLUTION INTRAMUSCULAR; INTRAVENOUS at 08:59

## 2020-06-22 RX ADMIN — PROPOFOL 50 MG: 10 INJECTION, EMULSION INTRAVENOUS at 09:38

## 2020-06-22 RX ADMIN — SODIUM CHLORIDE, POTASSIUM CHLORIDE, SODIUM LACTATE AND CALCIUM CHLORIDE: 600; 310; 30; 20 INJECTION, SOLUTION INTRAVENOUS at 08:26

## 2020-06-22 RX ADMIN — PROPOFOL 50 MG: 10 INJECTION, EMULSION INTRAVENOUS at 08:39

## 2020-06-22 RX ADMIN — FENTANYL CITRATE 25 MCG: 50 INJECTION, SOLUTION INTRAMUSCULAR; INTRAVENOUS at 08:38

## 2020-06-22 RX ADMIN — FENTANYL CITRATE 25 MCG: 50 INJECTION, SOLUTION INTRAMUSCULAR; INTRAVENOUS at 08:29

## 2020-06-22 RX ADMIN — FENTANYL CITRATE 25 MCG: 50 INJECTION, SOLUTION INTRAMUSCULAR; INTRAVENOUS at 11:25

## 2020-06-22 RX ADMIN — ACETAMINOPHEN 650 MG: 325 TABLET ORAL at 22:51

## 2020-06-22 RX ADMIN — Medication 40 MCG: at 09:44

## 2020-06-22 RX ADMIN — PROPOFOL 50 MG: 10 INJECTION, EMULSION INTRAVENOUS at 08:40

## 2020-06-22 RX ADMIN — MORPHINE SULFATE 2 MG: 2 INJECTION, SOLUTION INTRAMUSCULAR; INTRAVENOUS at 09:57

## 2020-06-22 RX ADMIN — MORPHINE SULFATE 2 MG: 2 INJECTION, SOLUTION INTRAMUSCULAR; INTRAVENOUS at 10:11

## 2020-06-22 RX ADMIN — LEVOFLOXACIN 500 MG: 5 INJECTION, SOLUTION INTRAVENOUS at 08:52

## 2020-06-22 RX ADMIN — Medication 40 MCG: at 09:08

## 2020-06-22 NOTE — PERIOP NOTES
EKG ok for surgery per Dr. Yue Kmi. Copy faxed to holding area. Spoke with Millie Short in holding and asked her to place EKG on pt's chart.

## 2020-06-22 NOTE — PERIOP NOTES
TRANSFER - OUT REPORT:    Verbal report given to [facility] FERMIN RN on Anastasia Geiger  being transferred to [facility] for routine post - op       Report consisted of patients Situation, Background, Assessment and   Recommendations(SBAR). Time Pre op antibiotic given:0852  Anesthesia Stop time: 1765  Coleman Present on Transfer to floor:yes  Order for Coleman on Chart:yes  Discharge Prescriptions with Chart:no    Information from the following report(s) SBAR was reviewed with the receiving nurse. Opportunity for questions and clarification was provided. Is the patient on 02? NO       L/Min        Other     Is the patient on a monitor? NO    Is the nurse transporting with the patient? NO    Surgical Waiting Area notified of patient's transfer from PACU? YES      The following personal items collected during your admission accompanied patient upon transfer:   Dental Appliance: Dental Appliances: None  Vision:    Hearing Aid:    Jewelry: Jewelry: None  Clothing: Clothing: Other (comment)(clothes and shoes to pacu)  Other Valuables:  Other Valuables: Eyeglasses(Glasses to pacu)  Valuables sent to safe:

## 2020-06-22 NOTE — BRIEF OP NOTE
Brief Postoperative Note    Patient: Cinthya Agustin  YOB: 1934  MRN: [id number]    Date of Procedure: 6/22/2020     Pre-Op Diagnosis: BLADDER MASS    Post-Op Diagnosis: Same as preoperative diagnosis. Procedure(s):  TRANSURETHRAL RESECTION OF BLADDER TUMOR, tur median lobe of prostate    Surgeon(s):  Lisa Lopez MD    Surgical Assistant: None    Anesthesia: General     Estimated Blood Loss (mL): 606     Complications: None    Specimens:   ID Type Source Tests Collected by Time Destination   1 : Bladder tumor large Fresh Bladder  Lisa Lopez MD 6/22/2020 1010 Pathology   2 : Prostate median lobe Fresh Prostate  Lisa Lopez MD 6/22/2020 1012 Pathology   3 : Additional tissue Fresh OTHER (use comments)  Lisa Lopez MD 6/22/2020 1021 Pathology   1 : Bladder cystoscope Urine Bladder CULTURE, URINE Lisa Lopez MD 6/22/2020 0901 Microbiology        Implants: * No implants in log *    Drains: * No LDAs found *    Findings: obstructing median lobe.   Large, over 5 cm bladder tumor l lat wall    Electronically Signed by Wesley Shelby MD on 6/22/2020 at 10:31 AM

## 2020-06-22 NOTE — ANESTHESIA PREPROCEDURE EVALUATION
Anesthetic History   No history of anesthetic complications            Review of Systems / Medical History  Patient summary reviewed, nursing notes reviewed and pertinent labs reviewed    Pulmonary  Within defined limits                 Neuro/Psych         TIA     Cardiovascular  Within defined limits  Hypertension        Dysrhythmias : atrial fibrillation        Comments: Normal echo 2016   GI/Hepatic/Renal  Within defined limits         PUD     Endo/Other  Within defined limits      Arthritis     Other Findings              Physical Exam    Airway  Mallampati: II  TM Distance: 4 - 6 cm  Neck ROM: normal range of motion   Mouth opening: Normal     Cardiovascular    Rhythm: regular  Rate: normal         Dental  No notable dental hx       Pulmonary  Breath sounds clear to auscultation               Abdominal         Other Findings            Anesthetic Plan    ASA: 3  Anesthesia type: general          Induction: Intravenous  Anesthetic plan and risks discussed with: Patient

## 2020-06-22 NOTE — ANESTHESIA POSTPROCEDURE EVALUATION
Post-Anesthesia Evaluation and Assessment    Patient: Junito Zamora MRN: [id number]  SSN: [id number]    YOB: 1934  Age: 80 y.o. Sex: male      I have evaluated the patient and they are stable and ready for discharge from the PACU. Cardiovascular Function/Vital Signs  Visit Vitals  /75 (BP 1 Location: Right arm, BP Patient Position: At rest;Supine)   Pulse 78   Temp (!) 35.8 °C (96.4 °F)   Resp 20   Wt 62.6 kg (138 lb)   SpO2 96%   BMI 20.98 kg/m²       Patient is status post General anesthesia for Procedure(s):  TRANSURETHRAL RESECTION OF BLADDER TUMOR. Nausea/Vomiting: None    Postoperative hydration reviewed and adequate. Pain:  Pain Scale 1: Visual (06/22/20 1039)  Pain Intensity 1: 0 (06/22/20 1039)   Managed    Neurological Status:   Neuro (WDL): Within Defined Limits (06/22/20 1039)  Neuro  LUE Motor Response: Purposeful (06/22/20 1039)  LLE Motor Response: Purposeful (06/22/20 1039)  RUE Motor Response: Purposeful (06/22/20 1039)  RLE Motor Response: Purposeful (06/22/20 1039)   At baseline    Mental Status, Level of Consciousness: Alert and  oriented to person, place, and time    Pulmonary Status:   O2 Device: Room air (06/22/20 1039)   Adequate oxygenation and airway patent    Complications related to anesthesia: None    Post-anesthesia assessment completed.  No concerns    Signed By: Sarwat Boland MD     June 22, 2020

## 2020-06-22 NOTE — HOME CARE
Please note that this patient was open to [address] [place] [address] services at the time of hospital admission. If services will be needed at discharge, please order to resume them. Thank you.    Nelsy. Tru Kruse

## 2020-06-22 NOTE — OP NOTES
[facility]  OPERATIVE REPORT    Name:  Tatianna León  MR#:  [id number]  :  1934  ACCOUNT #:  [de-identified]  DATE OF SERVICE:  2020      PREOPERATIVE DIAGNOSES:  Bladder tumor, large; BPH; bladder outlet obstruction. POSTOPERATIVE DIAGNOSES:  Bladder tumor, large; BPH; bladder outlet obstruction. PROCEDURES PERFORMED:  Transurethral resection of large bladder tumor and transurethral resection of median lobe of the prostate. SURGEON:  Demarcus Cali MD    ASSISTANT:  None. ANESTHESIA:  General.    COMPLICATIONS:  None. SPECIMENS REMOVED:  Bladder tumor, prostate tissue, other tissue, urine for culture. IMPLANTS:  None. ESTIMATED BLOOD LOSS:  300 mL. BRIEF SUMMARY:  This gentleman had urinary retention, gross hematuria. He was seen by Dr. Lucinda Shirley. CT scan showed a large tumor involving the left wall of the bladder. Coleman catheter was indwelling because of urinary retention. CT also showed prostatic enlargement with large median lobe. PROCEDURE:  After anesthesia, the patient was prepped and draped in lithotomy. The 21 cystoscope was passed through the urethra. The prostate was visually obstructing. There was a large median lobe which was potentially ball valving into the urethra. There was a large papillary tumor arising from the left lateral wall. This was over 5 cm. There was diffuse trabeculation 4+ with diverticula cellules and hypervascular mucosa. No other tumors were seen. The resectoscope was then inserted with the obturator in place and initially using pure cutting current, the tumor was resected beginning inferiorly. There was a large amount of bleeding from very vascular stalks that were difficult to control and visualization was impaired. At this point, attempt was made to resect using the coagulation setting only but this also was fraught with difficulty with vision.   Next, the tumor was cold looped out to debulk it and then using coagulation current, the base was attacked to try and interrupt the blood supply and this strategy was largely successful in that we were eventually able to secure the large arterial bleeders and visualization was improved. All visual tumor was resected. The resection was transmural and there were some areas of visible adipose tissue. All of the pieces were evacuated and sent for permanent section analysis. Then, the median lobe was resected and sent as a separate specimen. Hemostasis was obtained. Some additional tissue was evacuated likely containing both bladder tumor and prostate tissue and this was sent labeled additional tissue. A 24-Bots[place] 3-way Coleman was inserted and put to continuous irrigation. The patient was reacted from anesthesia and transferred to the recovery in stable condition.         Neda Dunaway MD      [id number]  D:  06/22/2020 10:41  T:  06/22/2020 18:35  JOB #:  [id number]  CC:  [address] [place] [address]

## 2020-06-23 VITALS
TEMPERATURE: 98 F | OXYGEN SATURATION: 95 % | BODY MASS INDEX: 20.98 KG/M2 | RESPIRATION RATE: 18 BRPM | SYSTOLIC BLOOD PRESSURE: 163 MMHG | WEIGHT: 138 LBS | HEART RATE: 80 BPM | DIASTOLIC BLOOD PRESSURE: 78 MMHG

## 2020-06-23 LAB
ANION GAP SERPL CALC-SCNC: 6 MMOL/L (ref 5–15)
BACTERIA SPEC CULT: NORMAL
BUN SERPL-MCNC: 16 MG/DL (ref 6–20)
BUN/CREAT SERPL: 17 (ref 12–20)
CALCIUM SERPL-MCNC: 8.7 MG/DL (ref 8.5–10.1)
CHLORIDE SERPL-SCNC: 105 MMOL/L (ref 97–108)
CO2 SERPL-SCNC: 28 MMOL/L (ref 21–32)
CREAT SERPL-MCNC: 0.96 MG/DL (ref 0.7–1.3)
GLUCOSE SERPL-MCNC: 139 MG/DL (ref 65–100)
HGB BLD-MCNC: 12.8 G/DL (ref 12.1–17)
POTASSIUM SERPL-SCNC: 3.9 MMOL/L (ref 3.5–5.1)
SERVICE CMNT-IMP: NORMAL
SODIUM SERPL-SCNC: 139 MMOL/L (ref 136–145)

## 2020-06-23 PROCEDURE — 3331090001 HH PPS REVENUE CREDIT

## 2020-06-23 PROCEDURE — 85018 HEMOGLOBIN: CPT

## 2020-06-23 PROCEDURE — 36415 COLL VENOUS BLD VENIPUNCTURE: CPT

## 2020-06-23 PROCEDURE — 80048 BASIC METABOLIC PNL TOTAL CA: CPT

## 2020-06-23 PROCEDURE — 74011000258 HC RX REV CODE- 258: Performed by: UROLOGY

## 2020-06-23 PROCEDURE — 74011250637 HC RX REV CODE- 250/637: Performed by: UROLOGY

## 2020-06-23 PROCEDURE — 99218 HC RM OBSERVATION: CPT

## 2020-06-23 PROCEDURE — 3331090002 HH PPS REVENUE DEBIT

## 2020-06-23 RX ORDER — LEVOFLOXACIN 250 MG/1
250 TABLET ORAL EVERY 24 HOURS
Status: DISCONTINUED | OUTPATIENT
Start: 2020-06-24 | End: 2020-06-23 | Stop reason: HOSPADM

## 2020-06-23 RX ADMIN — LEVOFLOXACIN 500 MG: 500 TABLET, FILM COATED ORAL at 09:13

## 2020-06-23 RX ADMIN — ACETAMINOPHEN 650 MG: 325 TABLET ORAL at 10:47

## 2020-06-23 RX ADMIN — DEXTROSE MONOHYDRATE AND SODIUM CHLORIDE 75 ML/HR: 5; .45 INJECTION, SOLUTION INTRAVENOUS at 06:06

## 2020-06-23 NOTE — PROGRESS NOTES
Day #2 of Levofloxacin  Indication:  UTI  Current regimen:  500 mg IV every 24 hours  Abx regimen: Levofloxacin monotherapy  Recent Labs     20  0604   CREA 0.96   BUN 16     Est CrCl: 48.9 ml/min; UO: - ml/kg/hr  Temp (24hrs), Av.7 °F (36.5 °C), Min:96.4 °F (35.8 °C), Max:98.3 °F (36.8 °C)    Cultures:    Urine: pending    Plan: Change to levofloxacin 250 mg IV every 24 hours per [facility] P&T Committee Protocol with respect to renal function. Pharmacy will continue to monitor patient daily and will make dosage adjustments based upon changing renal function.

## 2020-06-23 NOTE — PROGRESS NOTES
MAUREEN:  -discharge home today with wife  -resume home health with [facility] INPATIENT, [facility]    Care Management Interventions  PCP Verified by CM:  Yes  Palliative Care Criteria Met (RRAT>21 & CHF Dx)?: No  Transition of Care Consult (CM Consult): [address]: Yes  MyChart Signup: No  Discharge Durable Medical Equipment: No  Health Maintenance Reviewed: Yes  Physical Therapy Consult: No  Occupational Therapy Consult: No  Speech Therapy Consult: No  Current Support Network: Lives with Spouse  Confirm Follow Up Transport: Family  The Patient and/or Patient Representative was Provided with a Choice of Provider and Agrees with the Discharge Plan?: Yes  Discharge Location  Discharge Placement: Home with home health

## 2020-06-23 NOTE — PROGRESS NOTES
Bedside and Verbal shift change report given to Avery Grimes RN (oncoming nurse) by Kings Stanley (offgoing nurse). Report included the following information SBAR, Kardex, OR Summary, Procedure Summary, Intake/Output, MAR and Recent Results.

## 2020-06-23 NOTE — PROGRESS NOTES
Written and verbal discharge instructions, including Coleman/leg bag teaching, reviewed with patient and wife. Patient confirmed understanding with adequate teach back. IV removed per protocol. Belongings with patient at time of discharge.

## 2020-06-23 NOTE — DISCHARGE INSTRUCTIONS
Patient Discharge Instructions    Arely Law / [id number] : 1934    Admitted 2020 Discharged: 2020     Take Home Medications     If urine remains clear, restart plavix in 2 days. · It is important that you take the medication exactly as they are prescribed. · Keep your medication in the bottles provided by the pharmacist and keep a list of the medication names, dosages, and times to be taken in your wallet. · Do not take other medications without consulting your doctor. What to do at Home      Recommended activity: No Lifting for 6 weeks. Follow-up with [facility]. Call for an appointment (if not already scheduled)            [phone]. Information obtained by :  I understand that if any problems occur once I am at home I am to contact my physician. I understand and acknowledge receipt of the instructions indicated above.                                                                                                                                            Physician's or R.N.'s Signature                                                                  Date/Time                                                                                                                                              Patient or Representative Signature                                                          Date/Time

## 2020-06-23 NOTE — PROGRESS NOTES
Bedside shift change report given to Cecy Rod (oncoming nurse) by Jose Hurd (offgoing nurse). Report included the following information SBAR, Kardex and MAR.

## 2020-06-23 NOTE — PHYSICIAN ADVISORY
Letter of Status Determination:   Recommend hospitalization status upgraded from   INPATIENT  to INPATIENT  Status     Pt Name:  Dayanara Akers   MR#   [facility] # [id number] /  [id number]  Payor: Freda Ly / Plan: Catrachita Blood / Product Type: Managed Care Medicare /    [facility]#  [id number]   Room and Hospital  514/01  @ . [facility] 58 hospital   Hospitalization date  6/22/2020  7:12 AM   Current Attending Physician  No att. providers found   Principal diagnosis  Bladder cancer [facility]) [C67.9]     Clinicals  80 y.o. y.o  male hospitalized with above diagnosis   PROCEDURES PERFORMED:  Transurethral resection of large bladder tumor and transurethral resection of median lobe of the prostate     [place] (MCG) criteria   Does  NOT apply    STATUS DETERMINATION      This patient is at high risk of adverse events and deterioration based on documented clinical data, comorbid conditions and current acute care course. Mr. Dayanara Akers is expected to meet Inpatient Admission status criteria in accordance with CMS regulation Section 43 .3. Specifically, due to medical necessity the patient's stay is expected to exceed Two Midnights. It is our recommendation that this patient's hospitalization status should be upgraded from  INPATIENT to INPATIENT status. The final decision of the patient's hospitalization status depends on the attending physician's judgment. Additional comments     Payor: Freda Suárez / Plan: Catrachita Blood / Product Type: Managed Care Medicare /           [facility] Physician [facility] - THE [address]  Ltanya Apley, [place]  T: [phone]    [email or web address]

## 2020-06-24 PROCEDURE — 3331090002 HH PPS REVENUE DEBIT

## 2020-06-24 PROCEDURE — 3331090001 HH PPS REVENUE CREDIT

## 2020-06-25 ENCOUNTER — HOME CARE VISIT (OUTPATIENT)
Dept: SCHEDULING | Facility: HOME HEALTH | Age: 85
End: 2020-06-25
Payer: MEDICARE

## 2020-06-25 PROCEDURE — 3331090001 HH PPS REVENUE CREDIT

## 2020-06-25 PROCEDURE — G0299 HHS/HOSPICE OF RN EA 15 MIN: HCPCS

## 2020-06-25 PROCEDURE — 3331090002 HH PPS REVENUE DEBIT

## 2020-06-26 VITALS
DIASTOLIC BLOOD PRESSURE: 68 MMHG | OXYGEN SATURATION: 98 % | TEMPERATURE: 98.4 F | RESPIRATION RATE: 18 BRPM | SYSTOLIC BLOOD PRESSURE: 138 MMHG | HEART RATE: 64 BPM

## 2020-06-26 PROCEDURE — 3331090001 HH PPS REVENUE CREDIT

## 2020-06-26 PROCEDURE — 3331090002 HH PPS REVENUE DEBIT

## 2020-06-27 ENCOUNTER — HOME CARE VISIT (OUTPATIENT)
Dept: SCHEDULING | Facility: HOME HEALTH | Age: 85
End: 2020-06-27
Payer: MEDICARE

## 2020-06-27 VITALS
DIASTOLIC BLOOD PRESSURE: 62 MMHG | OXYGEN SATURATION: 98 % | TEMPERATURE: 97.8 F | RESPIRATION RATE: 8 BRPM | SYSTOLIC BLOOD PRESSURE: 132 MMHG | HEART RATE: 78 BPM

## 2020-06-27 PROCEDURE — G0299 HHS/HOSPICE OF RN EA 15 MIN: HCPCS

## 2020-06-27 PROCEDURE — 3331090001 HH PPS REVENUE CREDIT

## 2020-06-27 PROCEDURE — 3331090002 HH PPS REVENUE DEBIT

## 2020-06-28 PROCEDURE — 3331090002 HH PPS REVENUE DEBIT

## 2020-06-28 PROCEDURE — 3331090001 HH PPS REVENUE CREDIT

## 2020-06-29 PROCEDURE — 3331090002 HH PPS REVENUE DEBIT

## 2020-06-29 PROCEDURE — 3331090001 HH PPS REVENUE CREDIT

## 2020-06-29 NOTE — ADT AUTH CERT NOTES
PREVIOUSLY DENIED FOR INPATIENT DOWNGRADED TO OBSERVATION REF # [id number]     PLEASE FAX OR CALL BACK AUTHORIZATION FOR OBSERVATION  PHONE # [phone]  FAX # [phone]

## 2020-06-30 ENCOUNTER — HOME CARE VISIT (OUTPATIENT)
Dept: HOME HEALTH SERVICES | Facility: HOME HEALTH | Age: 85
End: 2020-06-30
Payer: MEDICARE

## 2020-06-30 PROCEDURE — 3331090001 HH PPS REVENUE CREDIT

## 2020-06-30 PROCEDURE — 3331090002 HH PPS REVENUE DEBIT

## 2020-06-30 NOTE — DISCHARGE SUMMARY
Robel P[facility] 290[address] SUMMARY    Name:  Antonio Casas  MR#:  [id number]  :  1934  ACCOUNT #:  [de-identified]  ADMIT DATE:  2020  DISCHARGE DATE:  2020      ADMITTING DIAGNOSIS:  Bladder tumor. DISCHARGE DIAGNOSIS:  Bladder tumor. PROCEDURE:  Transurethral resection of large bladder tumor. HOSPITAL COURSE:  This patient was admitted, underwent the above operation on the day of admission. Postoperatively, he was monitored on continuous bladder irrigation. His urine cleared. His bowel function returned. His pain was controlled and he was considered to be ready for discharge with Coleman catheter in position on 2020. CONDITION ON DISCHARGE:  Stable. Followup was arranged for outpatient visit catheter removal at [facility]. Pathology was pending. DISCHARGE MEDICATIONS:  Provided to the patient and he was discharged.       Isabel Ashraf MD      [id number]/  D:  2020 11:59  T:  2020 14:04  JOB #:  [id number]  CC:  [address] [place] [address]

## 2020-07-01 PROCEDURE — 3331090001 HH PPS REVENUE CREDIT

## 2020-07-01 PROCEDURE — 3331090002 HH PPS REVENUE DEBIT

## 2020-07-02 PROCEDURE — 3331090002 HH PPS REVENUE DEBIT

## 2020-07-02 PROCEDURE — 3331090001 HH PPS REVENUE CREDIT

## 2020-07-03 PROCEDURE — 3331090002 HH PPS REVENUE DEBIT

## 2020-07-03 PROCEDURE — 3331090001 HH PPS REVENUE CREDIT

## 2020-07-04 PROCEDURE — 3331090001 HH PPS REVENUE CREDIT

## 2020-07-04 PROCEDURE — 3331090002 HH PPS REVENUE DEBIT

## 2020-07-05 PROCEDURE — 3331090002 HH PPS REVENUE DEBIT

## 2020-07-05 PROCEDURE — 3331090001 HH PPS REVENUE CREDIT

## 2020-07-06 PROCEDURE — 3331090002 HH PPS REVENUE DEBIT

## 2020-07-06 PROCEDURE — 3331090001 HH PPS REVENUE CREDIT

## 2022-03-18 PROBLEM — C67.9 BLADDER CANCER (HCC): Status: ACTIVE | Noted: 2020-06-22

## 2022-03-19 PROBLEM — K59.00 CONSTIPATED: Status: ACTIVE | Noted: 2020-06-08

## 2022-03-20 PROBLEM — N32.89 BLADDER MASS: Status: ACTIVE | Noted: 2020-06-08

## 2024-01-05 ENCOUNTER — HOSPITAL ENCOUNTER (EMERGENCY)
Facility: HOSPITAL | Age: 89
Discharge: HOME | End: 2024-01-05
Attending: EMERGENCY MEDICINE
Payer: MEDICARE

## 2024-01-05 ENCOUNTER — APPOINTMENT (OUTPATIENT)
Facility: HOSPITAL | Age: 89
End: 2024-01-05
Payer: MEDICARE

## 2024-01-05 VITALS
BODY MASS INDEX: 23.04 KG/M2 | SYSTOLIC BLOOD PRESSURE: 163 MMHG | DIASTOLIC BLOOD PRESSURE: 62 MMHG | HEIGHT: 68 IN | HEART RATE: 63 BPM | TEMPERATURE: 98.1 F | RESPIRATION RATE: 16 BRPM | WEIGHT: 152 LBS | OXYGEN SATURATION: 98 %

## 2024-01-05 DIAGNOSIS — T14.8XXA HEMATOMA: Primary | ICD-10-CM

## 2024-01-05 PROCEDURE — 72192 CT PELVIS W/O DYE: CPT

## 2024-01-05 PROCEDURE — 99284 EMERGENCY DEPT VISIT MOD MDM: CPT

## 2024-01-05 PROCEDURE — 70450 CT HEAD/BRAIN W/O DYE: CPT

## 2024-01-05 ASSESSMENT — PAIN - FUNCTIONAL ASSESSMENT
PAIN_FUNCTIONAL_ASSESSMENT: ACTIVITIES ARE NOT PREVENTED
PAIN_FUNCTIONAL_ASSESSMENT: 0-10

## 2024-01-05 ASSESSMENT — PAIN DESCRIPTION - DESCRIPTORS: DESCRIPTORS: SHARP

## 2024-01-05 ASSESSMENT — PAIN DESCRIPTION - ORIENTATION: ORIENTATION: RIGHT

## 2024-01-05 ASSESSMENT — PAIN SCALES - GENERAL: PAINLEVEL_OUTOF10: 10

## 2024-01-05 ASSESSMENT — PAIN DESCRIPTION - PAIN TYPE: TYPE: ACUTE PAIN

## 2024-01-05 ASSESSMENT — PAIN DESCRIPTION - LOCATION: LOCATION: HIP

## 2024-01-05 NOTE — ED NOTES
Patient returned from CT imaging via stretcher.   Resting on stretcher with warm blanket given for comfort, and call light is within reach. Family at bedside.

## 2024-01-05 NOTE — ED PROVIDER NOTES
[facility] EMERGENCY DEPT  EMERGENCY DEPARTMENT ENCOUNTER      Patient Name: Carmelo Burris  MRN: [id number]  Birthdate 6/14/1934  Date of Evaluation: 1/5/2024  Physician: Mihai Herrera MD    CHIEF COMPLAINT       Chief Complaint   Patient presents with    Fall    Hip Pain       HISTORY OF PRESENT ILLNESS   (Location/Symptom, Timing/Onset, Context/Setting, Quality, Duration, Modifying Factors, Severity)   Carmelo Burris, 89 y.o., male     89-year-old male presents with right hip pain.  Patient fell 4 to 5 days ago at the bottom of the steps landing in a flower bed.  He did hit his head but did not lose consciousness.  He has not been evaluated since the fall.          Nursing Notes were reviewed.    REVIEW OF SYSTEMS    (Not required)   Review of Systems    Except as noted above the remainder of the review of systems was reviewed and negative.     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    Chronic pain     KNEES    Enlarged prostate     High cholesterol     Hx of completed stroke     Hypertension     PUD (peptic ulcer disease)     Stroke ([facility]) 2002    LOSS OF PERPHERIAL VISION ON LEFT SIDE       SURGICAL HISTORY       Past Surgical History:   Procedure Laterality Date    COLONOSCOPY      HERNIA REPAIR      X1 @ [facility]    HERNIA REPAIR      X1 @     ORTHOPEDIC SURGERY Right     KNEE RE[LACEMENT- DONE AT [facility]    TONSILLECTOMY         CURRENT MEDICATIONS       There are no discharge medications for this patient.      ALLERGIES     Patient has no known allergies.    FAMILY HISTORY       Family History   Problem Relation Age of Onset    Diabetes Daughter         BREAST    No Known Problems Daughter     No Known Problems Daughter     Cancer Brother         LYMPHOMA    Heart Disease Mother     Cancer Father         PROSTATE    No Known Problems Sister     Anesth Problems Neg Hx

## 2024-01-05 NOTE — ED TRIAGE NOTES
Pt assisted to treatment area he states that on Tuesday he was walking down the outside stairs and the umbrella got tangled with his cane causing him to fall back hitting his head on a ceramic snail in the garden and his head on something else.  He has a large hematoma to the right hip and buttocks area.  NO LOC Dr Herrera at the bedside

## 2024-01-05 NOTE — DISCHARGE INSTRUCTIONS
Thank you for allowing us to provide you with medical care today.  We realize that you have many choices for your emergency care needs.  We thank you for choosing [facility].  Please choose us in the future for any continued health care needs.     The exam and treatment you received in the Emergency Department were for an emergent problem and are not intended as complete care. It is important that you follow up with a doctor, nurse practitioner, or physician's assistant for ongoing care. If your symptoms worsen or you do not improve as expected and you are unable to reach your usual health care provider, you should return to the Emergency Department. We are available 24 hours a day.     Please make an appointment with your health care provider(s) for follow up of your Emergency Department visit.  Take this sheet with you when you go to your follow-up visit.

## 2024-01-05 NOTE — ED NOTES
Patient wheeled out of ED, with verbalized understanding of discharge instructions and follow up care.

## 2024-01-26 NOTE — PROGRESS NOTES
Spiritual Care Assessment/Progress Note  Aramise Factor      NAME: Solomon Barahona      MRN: [id number]  AGE: 80 y.o. SEX: male  Orthodox Affiliation: Alevism   Language: English     6/9/2020     Total Time (in minutes): 5     Spiritual Assessment begun in [facility]  MED SURG 2 through conversation with:         [x]Patient        [] Family    [] Friend(s)        Reason for Consult: [facility]     Spiritual beliefs: (Please include comment if needed)     [x] Identifies with a hernan tradition: Alevism        [x] Supported by a hernan community: Ligia Avilez           [] Claims no spiritual orientation:           [] Seeking spiritual identity:                [] Adheres to an individual form of spirituality:           [] Not able to assess:                           Identified resources for coping:      [x] Prayer                               [x] Music                  [] Guided Imagery     [x] Family/friends                 [] Pet visits     [] Devotional reading                         [] Unknown     [] Other:                                              Interventions offered during this visit: (See comments for more details)    Patient Interventions: Affirmation of hernan, Communion [employer]), Initial/Spiritual assessment, patient floor, Prayer (actual), Prayer (assurance of)     Family/Friend(s):  Affirmation of hernan, Communion (Alevism), Prayer (actual), Prayer (assurance of)     Plan of Care:     [x] Support spiritual and/or cultural needs    [] Support AMD and/or advance care planning process      [] Support grieving process   [] Coordinate Rites and/or Rituals    [] Coordination with community clergy   [] No spiritual needs identified at this time   [] Detailed Plan of Care below (See Comments)  [] Make referral to Music Therapy  [] Make referral to Pet Therapy     [] Make referral to Addiction services  [] Make referral to [facility]  [] Make referral to Spiritual Care Partner  [] No future visits requested        [] Follow up visits as needed     Comments: Anjali Cordova and his wife both declined communion today. He is waiting to be discharged home. Prayer offered.     Esbon Malik, SBS, RN, ACSW, LCSW   Page:  [phone] No

## 2025-06-22 ENCOUNTER — HOSPITAL ENCOUNTER (EMERGENCY)
Facility: HOSPITAL | Age: 89
Discharge: HOME | End: 2025-06-22
Attending: STUDENT IN AN ORGANIZED HEALTH CARE EDUCATION/TRAINING PROGRAM
Payer: MEDICARE

## 2025-06-22 VITALS
HEART RATE: 79 BPM | SYSTOLIC BLOOD PRESSURE: 184 MMHG | OXYGEN SATURATION: 98 % | BODY MASS INDEX: 20 KG/M2 | TEMPERATURE: 98.2 F | HEIGHT: 68 IN | RESPIRATION RATE: 16 BRPM | DIASTOLIC BLOOD PRESSURE: 77 MMHG | WEIGHT: 132 LBS

## 2025-06-22 DIAGNOSIS — R33.8 RETENTION OF URINE DUE TO OCCLUSION OF FOLEY CATHETER: Primary | ICD-10-CM

## 2025-06-22 DIAGNOSIS — T83.198A RETENTION OF URINE DUE TO OCCLUSION OF FOLEY CATHETER: Primary | ICD-10-CM

## 2025-06-22 PROCEDURE — 99283 EMERGENCY DEPT VISIT LOW MDM: CPT

## 2025-06-22 PROCEDURE — 87186 SC STD MICRODIL/AGAR DIL: CPT

## 2025-06-22 PROCEDURE — 87086 URINE CULTURE/COLONY COUNT: CPT

## 2025-06-22 PROCEDURE — 6370000000 HC RX 637 (ALT 250 FOR IP): Performed by: STUDENT IN AN ORGANIZED HEALTH CARE EDUCATION/TRAINING PROGRAM

## 2025-06-22 PROCEDURE — 87088 URINE BACTERIA CULTURE: CPT

## 2025-06-22 RX ORDER — LIDOCAINE HYDROCHLORIDE 20 MG/ML
JELLY TOPICAL ONCE
Status: COMPLETED | OUTPATIENT
Start: 2025-06-22 | End: 2025-06-22

## 2025-06-22 RX ADMIN — LIDOCAINE HYDROCHLORIDE: 20 JELLY TOPICAL at 05:04

## 2025-06-22 ASSESSMENT — PAIN DESCRIPTION - LOCATION: LOCATION: PENIS

## 2025-06-22 ASSESSMENT — PAIN DESCRIPTION - PAIN TYPE: TYPE: ACUTE PAIN

## 2025-06-22 ASSESSMENT — PAIN SCALES - GENERAL: PAINLEVEL_OUTOF10: 5

## 2025-06-22 ASSESSMENT — PAIN DESCRIPTION - FREQUENCY: FREQUENCY: CONTINUOUS

## 2025-06-22 ASSESSMENT — PAIN DESCRIPTION - ORIENTATION: ORIENTATION: DISTAL

## 2025-06-22 ASSESSMENT — PAIN DESCRIPTION - DESCRIPTORS: DESCRIPTORS: ACHING

## 2025-06-22 NOTE — ED TRIAGE NOTES
Pt reports he got his llanes replaced 2 weeks ago. Pt states that yesterday his llanes has not been draining properly into the drainage bag. Pt reports that urine has been coming out of his urethra instead of coming out the llanes.

## 2025-06-22 NOTE — ED PROVIDER NOTES
HPI    Carmelo Burris is a 89-year-old man with a history of atrial fibrillation, arthritis, bladder cancer, chronic pain, benign prostatic hyperplasia, hyperlipidemia, peptic ulcer disease, hypertension, and prior strokes who has had a chronic indwelling Suero catheter for three years. He presents with an inability to urinate for the past 10 hours, noting he “hasn’t felt right” since his Suero was changed earlier this month. He denies fever, dysuria, hematuria, or other signs of infection and reports that the catheter balloon may have been inflated within the prostatic urethra rather than the bladder.    Physical Exam  Vitals reviewed.   Constitutional:       General: He is not in acute distress.     Appearance: Normal appearance.   HENT:      Head: Normocephalic.      Mouth/Throat:      Mouth: Mucous membranes are moist.   Eyes:      Extraocular Movements: Extraocular movements intact.      Pupils: Pupils are equal, round, and reactive to light.   Cardiovascular:      Pulses: Normal pulses.   Pulmonary:      Effort: Pulmonary effort is normal. No respiratory distress.   Abdominal:      General: Abdomen is flat.   Genitourinary:     Comments: Suero in place, minimal urine in the bag.  Musculoskeletal:      Cervical back: Neck supple. No rigidity.      Right lower leg: No edema.      Left lower leg: No edema.   Skin:     General: Skin is warm.      Capillary Refill: Capillary refill takes less than 2 seconds.   Neurological:      General: No focal deficit present.      Mental Status: He is alert. Mental status is at baseline.   Psychiatric:         Mood and Affect: Mood normal.           LABORATORY TESTS:  Labs Reviewed   CULTURE, URINE       IMAGING RESULTS:  No orders to display       MEDICATIONS GIVEN:  Medications   lidocaine (XYLOCAINE) 2 % uro-jet ( Topical Given 6/22/25 2390)       CONSULTS:  None         Medical Decision Making  Suero catheter exchanged for same-size catheter using Uro-Jet for analgesia;

## 2025-06-25 LAB
BACTERIA SPEC CULT: ABNORMAL
BACTERIA SPEC CULT: ABNORMAL
CC UR VC: ABNORMAL
SERVICE CMNT-IMP: ABNORMAL

## 2025-06-26 ENCOUNTER — RESULTS FOLLOW-UP (OUTPATIENT)
Facility: HOSPITAL | Age: 89
End: 2025-06-26

## 2025-06-26 RX ORDER — CEFDINIR 300 MG/1
300 CAPSULE ORAL 2 TIMES DAILY
Qty: 14 CAPSULE | Refills: 0 | Status: SHIPPED | OUTPATIENT
Start: 2025-06-26 | End: 2025-07-03

## 2025-07-23 ENCOUNTER — HOSPITAL ENCOUNTER (EMERGENCY)
Facility: HOSPITAL | Age: 89
Discharge: HOME | End: 2025-07-23
Attending: EMERGENCY MEDICINE
Payer: MEDICARE

## 2025-07-23 VITALS
OXYGEN SATURATION: 98 % | SYSTOLIC BLOOD PRESSURE: 165 MMHG | WEIGHT: 130 LBS | HEIGHT: 68 IN | BODY MASS INDEX: 19.7 KG/M2 | DIASTOLIC BLOOD PRESSURE: 50 MMHG | RESPIRATION RATE: 16 BRPM | TEMPERATURE: 97.4 F | HEART RATE: 66 BPM

## 2025-07-23 DIAGNOSIS — N39.0 URINARY TRACT INFECTION WITH HEMATURIA, SITE UNSPECIFIED: ICD-10-CM

## 2025-07-23 DIAGNOSIS — R31.9 URINARY TRACT INFECTION WITH HEMATURIA, SITE UNSPECIFIED: ICD-10-CM

## 2025-07-23 DIAGNOSIS — T83.9XXA FOLEY CATHETER PROBLEM, INITIAL ENCOUNTER: Primary | ICD-10-CM

## 2025-07-23 LAB
APPEARANCE UR: ABNORMAL
BACTERIA URNS QL MICRO: ABNORMAL /HPF
BILIRUB UR QL: NEGATIVE
COLOR UR: YELLOW
EPITH CASTS URNS QL MICRO: ABNORMAL /LPF
GLUCOSE UR STRIP.AUTO-MCNC: NEGATIVE MG/DL
HGB UR QL STRIP: ABNORMAL
KETONES UR QL STRIP.AUTO: NEGATIVE MG/DL
LEUKOCYTE ESTERASE UR QL STRIP.AUTO: ABNORMAL
NITRITE UR QL STRIP.AUTO: POSITIVE
PH UR STRIP: 8 (ref 5–8)
PROT UR STRIP-MCNC: 300 MG/DL
RBC #/AREA URNS HPF: ABNORMAL /HPF (ref 0–5)
SP GR UR REFRACTOMETRY: 1.02 (ref 1–1.03)
TRI-PHOS CRY URNS QL MICRO: ABNORMAL
URINE CULTURE IF INDICATED: ABNORMAL
UROBILINOGEN UR QL STRIP.AUTO: 0.2 EU/DL (ref 0.2–1)
WBC URNS QL MICRO: ABNORMAL /HPF (ref 0–4)

## 2025-07-23 PROCEDURE — 6370000000 HC RX 637 (ALT 250 FOR IP): Performed by: EMERGENCY MEDICINE

## 2025-07-23 PROCEDURE — 99283 EMERGENCY DEPT VISIT LOW MDM: CPT

## 2025-07-23 PROCEDURE — 81001 URINALYSIS AUTO W/SCOPE: CPT

## 2025-07-23 PROCEDURE — 51702 INSERT TEMP BLADDER CATH: CPT

## 2025-07-23 RX ORDER — MULTIVIT-MIN/IRON/FOLIC ACID/K 18-600-40
2000 CAPSULE ORAL DAILY
COMMUNITY

## 2025-07-23 RX ORDER — DILTIAZEM HYDROCHLORIDE 240 MG/1
240 CAPSULE, EXTENDED RELEASE ORAL DAILY
COMMUNITY

## 2025-07-23 RX ORDER — DUTASTERIDE 0.5 MG/1
0.5 CAPSULE, LIQUID FILLED ORAL DAILY
COMMUNITY

## 2025-07-23 RX ORDER — HYDROCODONE BITARTRATE AND ACETAMINOPHEN 5; 325 MG/1; MG/1
1 TABLET ORAL EVERY 6 HOURS PRN
COMMUNITY

## 2025-07-23 RX ORDER — CIPROFLOXACIN 250 MG/1
250 TABLET, FILM COATED ORAL 2 TIMES DAILY
Qty: 14 TABLET | Refills: 0 | Status: SHIPPED | OUTPATIENT
Start: 2025-07-23 | End: 2025-07-30

## 2025-07-23 RX ORDER — CIPROFLOXACIN 500 MG/1
250 TABLET, FILM COATED ORAL
Status: COMPLETED | OUTPATIENT
Start: 2025-07-23 | End: 2025-07-23

## 2025-07-23 RX ORDER — LIDOCAINE HYDROCHLORIDE 20 MG/ML
JELLY TOPICAL PRN
Status: DISCONTINUED | OUTPATIENT
Start: 2025-07-23 | End: 2025-07-24 | Stop reason: HOSPADM

## 2025-07-23 RX ORDER — CLOPIDOGREL BISULFATE 75 MG/1
75 TABLET ORAL DAILY
COMMUNITY

## 2025-07-23 RX ORDER — HYDROCHLOROTHIAZIDE 25 MG/1
25 TABLET ORAL DAILY
COMMUNITY

## 2025-07-23 RX ORDER — ATORVASTATIN CALCIUM 20 MG/1
20 TABLET, FILM COATED ORAL DAILY
COMMUNITY

## 2025-07-23 RX ADMIN — CIPROFLOXACIN HYDROCHLORIDE 250 MG: 500 TABLET, FILM COATED ORAL at 23:28

## 2025-07-23 RX ADMIN — LIDOCAINE HYDROCHLORIDE: 20 JELLY TOPICAL at 22:42

## 2025-07-23 ASSESSMENT — PAIN DESCRIPTION - ORIENTATION: ORIENTATION: LOWER

## 2025-07-23 ASSESSMENT — LIFESTYLE VARIABLES
HOW OFTEN DO YOU HAVE A DRINK CONTAINING ALCOHOL: NEVER
HOW MANY STANDARD DRINKS CONTAINING ALCOHOL DO YOU HAVE ON A TYPICAL DAY: PATIENT DOES NOT DRINK

## 2025-07-23 ASSESSMENT — PAIN DESCRIPTION - LOCATION: LOCATION: ABDOMEN

## 2025-07-23 ASSESSMENT — PAIN DESCRIPTION - PAIN TYPE: TYPE: ACUTE PAIN

## 2025-07-23 ASSESSMENT — PAIN - FUNCTIONAL ASSESSMENT
PAIN_FUNCTIONAL_ASSESSMENT: PREVENTS OR INTERFERES SOME ACTIVE ACTIVITIES AND ADLS
PAIN_FUNCTIONAL_ASSESSMENT: 0-10

## 2025-07-23 ASSESSMENT — PAIN SCALES - GENERAL: PAINLEVEL_OUTOF10: 10

## 2025-07-23 ASSESSMENT — PAIN DESCRIPTION - DESCRIPTORS: DESCRIPTORS: CRAMPING

## 2025-07-24 NOTE — ED TRIAGE NOTES
Pt was wheeled to the treatment area accompanied by his wife. Wife states \"he has had a chronic catheter for 3 years for bladder cancer, tonight I switched it over to the night bag after that there was no more urine we think its clogged.\"

## 2025-07-24 NOTE — ED PROVIDER NOTES
[place] EMERGENCY DEPARTMENT  EMERGENCY DEPARTMENT ENCOUNTER      Pt Name: Carmelo Burris  MRN: [id number]  Birthdate 6/14/1934  Date of evaluation: 7/23/2025  Provider: Jessee Mullins MD    CHIEF COMPLAINT       Chief Complaint   Patient presents with    Urinary Catheter         HISTORY OF PRESENT ILLNESS   (Location/Symptom, Timing/Onset, Context/Setting, Quality, Duration, Modifying Factors, Severity)  Note limiting factors.   89-year-old male with a past medical history significant for A-fib, arthritis, stroke, a large prostate, hyperlipidemia, hypertension, bladder cancer and chronic urinary retention and Suero catheter dependent who presents to the ER for evaluation for increased abdominal pain and inability to void to the catheter as he has not been able to empty his bag for the last 6 to 8 hours.  The patient stated that his last catheter was last changed approximately 3 weeks ago.  He denies any fever and chills, nausea or vomiting, chest pain or shortness of breath, diarrhea, constipation, dysuria or urinary discomfort at this time.            Review of External Medical Records:     Nursing Notes were reviewed.    REVIEW OF SYSTEMS    (2-9 systems for level 4, 10 or more for level 5)     Review of Systems    Except as noted above the remainder of the review of systems was reviewed and negative.       PAST MEDICAL HISTORY     Past Medical History:   Diagnosis Date    A-fib (HCC)     Arthritis     OSTE0-HANDS, KNEES    Bladder cancer (HCC)     Cancer (HCC) 2015    COLON CANCER    Chronic pain     KNEES    Enlarged prostate     High cholesterol     Hx of completed stroke     Hypertension     PUD (peptic ulcer disease)     Stroke (HCC) 2002    LOSS OF PERPHERIAL VISION ON LEFT SIDE         SURGICAL HISTORY       Past Surgical History:   Procedure Laterality Date    COLONOSCOPY      HERNIA REPAIR      X1 @ [facility]    HERNIA REPAIR      X1 @     ORTHOPEDIC SURGERY Right     KNEE RE[LACEMENT- DONE

## 2025-07-24 NOTE — ED NOTES
The patient was discharged home by provider in stable condition with wife. The patient is alert and oriented, in no respiratory distress. The patient's diagnosis, condition and treatment were explained. The patient expressed understanding and denies any questions or concerns at this time. Patient leaves treatment area via wheelchair with all personal belongings.

## (undated) DEVICE — SOL IRR GLYC 1.5 % 3000ML --

## (undated) DEVICE — SYR 10ML LUER LOK 1/5ML GRAD --

## (undated) DEVICE — Z DISCONTINUED USE 2599823 ELECTRODE ENDO 27FR 0.3MM MPLR LOOP DISP

## (undated) DEVICE — GARMENT,MEDLINE,DVT,INT,CALF,MED, GEN2: Brand: MEDLINE

## (undated) DEVICE — INFECTION CONTROL KIT SYS

## (undated) DEVICE — GOWN,SIRUS,POLYRNF,BRTHSLV,XL,30/CS: Brand: MEDLINE

## (undated) DEVICE — 3M™ RANGER™ FLUID WARMING IRRIGATION SET, 24750, 10/CASE: Brand: 3M™ RANGER™

## (undated) DEVICE — TIDISHIELD UROLOGY DRAIN BAGS FROSTY VINYL STERILE FITS SIEMENS UROSKOP ACCESS 20 PER CASE: Brand: TIDISHIELD

## (undated) DEVICE — CATH URETH FOL 3W SH 24FRX5ML -- LUBRICATH

## (undated) DEVICE — REM POLYHESIVE ADULT PATIENT RETURN ELECTRODE: Brand: VALLEYLAB

## (undated) DEVICE — PACK,CYSTOSCOPY,PK III,SIRUS: Brand: MEDLINE

## (undated) DEVICE — CONTAINER,SPECIMEN,4OZ,OR STRL: Brand: MEDLINE

## (undated) DEVICE — BASIC SINGLE BASIN BTC-LF: Brand: MEDLINE INDUSTRIES, INC.

## (undated) DEVICE — SOLUTION SCRB 2OZ 10% POVIDONE IOD ANTIMIC BTL

## (undated) DEVICE — MARKER,SKIN,WI/RULER AND LABELS: Brand: MEDLINE

## (undated) DEVICE — SOLUTION IRRIG 1000ML H2O STRL BLT

## (undated) DEVICE — STERILE POLYISOPRENE POWDER-FREE SURGICAL GLOVES WITH EMOLLIENT COATING: Brand: PROTEXIS

## (undated) DEVICE — TUBING, SUCTION, 1/4" X 12', STRAIGHT: Brand: MEDLINE